# Patient Record
Sex: FEMALE | Race: WHITE | NOT HISPANIC OR LATINO | Employment: FULL TIME | URBAN - METROPOLITAN AREA
[De-identification: names, ages, dates, MRNs, and addresses within clinical notes are randomized per-mention and may not be internally consistent; named-entity substitution may affect disease eponyms.]

---
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
## 2020-02-27 ENCOUNTER — APPOINTMENT (OUTPATIENT)
Dept: LAB | Facility: HOSPITAL | Age: 20
End: 2020-02-27

## 2020-02-27 ENCOUNTER — TRANSCRIBE ORDERS (OUTPATIENT)
Dept: LAB | Facility: HOSPITAL | Age: 20
End: 2020-02-27

## 2020-02-27 DIAGNOSIS — R35.0 FREQUENCY OF MICTURITION: Primary | ICD-10-CM

## 2020-02-27 PROCEDURE — 87186 SC STD MICRODIL/AGAR DIL: CPT | Performed by: PEDIATRICS

## 2020-02-27 PROCEDURE — 87088 URINE BACTERIA CULTURE: CPT | Performed by: PEDIATRICS

## 2020-02-27 PROCEDURE — 87086 URINE CULTURE/COLONY COUNT: CPT | Performed by: PEDIATRICS

## 2020-02-29 LAB — BACTERIA SPEC AEROBE CULT: ABNORMAL

## 2020-03-16 ENCOUNTER — LAB (OUTPATIENT)
Dept: LAB | Facility: HOSPITAL | Age: 20
End: 2020-03-16

## 2020-03-16 ENCOUNTER — TRANSCRIBE ORDERS (OUTPATIENT)
Dept: LAB | Facility: HOSPITAL | Age: 20
End: 2020-03-16

## 2020-03-16 DIAGNOSIS — R35.0 FREQUENCY OF URINATION: Primary | ICD-10-CM

## 2020-03-16 DIAGNOSIS — R35.0 FREQUENCY OF URINATION: ICD-10-CM

## 2020-03-16 PROCEDURE — 87086 URINE CULTURE/COLONY COUNT: CPT

## 2020-03-17 LAB — BACTERIA SPEC AEROBE CULT: NO GROWTH

## 2020-08-25 ENCOUNTER — OFFICE VISIT (OUTPATIENT)
Dept: ORTHOPEDIC SURGERY | Facility: CLINIC | Age: 20
End: 2020-08-25

## 2020-08-25 VITALS — HEART RATE: 112 BPM | HEIGHT: 60 IN | OXYGEN SATURATION: 98 % | WEIGHT: 119.93 LBS | BODY MASS INDEX: 23.55 KG/M2

## 2020-08-25 DIAGNOSIS — G56.03 BILATERAL CARPAL TUNNEL SYNDROME: ICD-10-CM

## 2020-08-25 DIAGNOSIS — M25.531 RIGHT WRIST PAIN: Primary | ICD-10-CM

## 2020-08-25 PROCEDURE — 99203 OFFICE O/P NEW LOW 30 MIN: CPT | Performed by: PHYSICIAN ASSISTANT

## 2020-08-25 RX ORDER — HYDROCODONE BITARTRATE AND ACETAMINOPHEN 5; 325 MG/1; MG/1
1 TABLET ORAL EVERY 6 HOURS PRN
COMMUNITY
End: 2020-09-12 | Stop reason: HOSPADM

## 2020-08-25 NOTE — PROGRESS NOTES
[facility] Note    Subjective     Patient: Elza Rico  : 2000    Primary Care Provider: Shonda Chinchilla MD    Requesting Provider: As above    Pain of the Right Wrist  And left wrist    History    Chief Complaint: Bilateral wrist pain and numbness and tingling    History of Present Illness: Is a very pleasant 19-year-old female presenting today to discuss her 1 month history of bilateral wrist and hand numbness and tingling.  She is right-hand dominant.  She complains of symptoms in the thumb index long and half of her ring finger.  Is worse at night.  She works in welding for the past 5 months.  She rates the pain to be 5/10 and dull and aching.  She has it with both functional pain and night pain.  She has treated with hydrocodone, Robaxin without any improve pain.  She is here for further evaluation treatment recommendations.    Current Outpatient Medications on File Prior to Visit   Medication Sig Dispense Refill   • HYDROcodone-acetaminophen (NORCO) 5-325 MG per tablet Take 1 tablet by mouth Every 6 (Six) Hours As Needed.     • levocetirizine (XYZAL) 5 MG tablet Take 5 mg by mouth Every Evening.     • methocarbamol (ROBAXIN) 500 MG tablet Take 1 tablet by mouth 3 (Three) Times a Day. 21 tablet 0   • naproxen (EC NAPROSYN) 500 MG EC tablet Take 1 tablet by mouth 2 (Two) Times a Day With Meals. 60 tablet 0   • sertraline (ZOLOFT) 25 MG tablet Take 25 mg by mouth Daily.     • traZODone (DESYREL) 50 MG tablet Take 50 mg by mouth Every Night.       No current facility-administered medications on file prior to visit.       No Known Allergies   Past Medical History:   Diagnosis Date   • BENITO (juvenile idiopathic arthritis) (CMS/[facility])      History reviewed. No pertinent surgical history.  History reviewed. No pertinent family history.   Social History     Socioeconomic History   • Marital status: Single     Spouse name: Not on file   • Number of children: Not on file   •  "Years of education: Not on file   • Highest education level: Not on file   Tobacco Use   • Smoking status: Never Smoker   • Smokeless tobacco: Never Used   Substance and Sexual Activity   • Alcohol use: No     Frequency: Never   • Drug use: No        Review of Systems   Constitutional: Negative.    HENT: Negative.    Eyes: Negative.    Respiratory: Negative.    Cardiovascular: Negative.    Gastrointestinal: Negative.    Endocrine: Negative.    Genitourinary: Negative.    Musculoskeletal: Positive for arthralgias.   Skin: Negative.    Allergic/Immunologic: Negative.    Neurological: Negative.    Hematological: Negative.    Psychiatric/Behavioral: Negative.        The following portions of the patient's history were reviewed and updated as appropriate: allergies, current medications, past family history, past medical history, past social history, past surgical history and problem list.      Objective      Physical Exam  Pulse 112   Ht 152.4 cm (60\")   Wt 54.4 kg (119 lb 14.9 oz)   SpO2 98%   BMI 23.42 kg/m²     Body mass index is 23.42 kg/m².    GENERAL: Body habitus: normal weight for height    Gait: normal     Mental Status:  awake and alert; oriented to person, place, and time    Voice:  clear  SKIN:  Normal    Hair Growth:  Right:normal; Left:  normal  HEENT: Head: Normocephalic, atraumatic,  without obvious abnormality.  eye: normal external eye, no icterus   PULM:  Repiratory effort normal    Ortho Exam  Peripheral Vascular   Bilateral Upper Extremity    No cyanotic nail beds    Pink nail beds and rapid capillary refill   Palpation    Radial Pulse - Bilaterally normal    Neurologic   Sensory: Light touch intact- Right and left hand    Left Upper Extremity    Left wrist extensors: 5/5    Left wrist flexors: 5/5    Left intrinsics: 5/5   Right Upper Extremity    Right wrist extensors: 5/5    Right wrist flexors: 5/5    Right intrinsics: 5/5    Musculoskeletal   Left Elbow    Forearm supination: AROM - 90 " degrees    Forearm pronation: AROM - 90 degrees   Right Elbow    Forearm supination: AROM - 90 degrees    Forearm pronation: AROM - 90 degrees     Inspection and Palpation   Right Wrist      Tenderness - none    Swelling - none    Crepitus - none    Muscle tone - no atrophy   Left Wrist    Tenderness - none    Swelling - none    Crepitus - none    Muscle tone - no atrophy     ROJM:   Left Wrist    Flexion: AROM - 90 degrees    Extension: AROM - 90 degrees   Right Wrist    Flexion: AROM - 90 degrees    Extension: AROM - 90 degrees     Deformities, Malalignments, Discrepancies    None     Functional Testing   Right Wrist    Tinel's Sign positive    Phalen's Sign positive   Left Wrist    Tinel's Sign positive    Phalen's Sign negative           Strength and Tone    Right  strength: good    Left  strength: good     Hand Exam:          Full range of motion of the thumb MCPJ and IPJ bilaterally    Full range of motion of the index finger MCPJ, PIPJ and DIPJ  bilaterally    Full range of motion of the middle finger MCPJ, PIPJ and DIPJ bilaterally    Full range of motion of the ring finger MCPJ, PIPJ and DIPJ bilaterally    Full range of motion of the small finger MCPJ, PIPJ and DIPJ bialterally    FDS, FDP and extensor tendons are intact in the index, middle, ring and small fingers bilaterally    FPJ and extensor tendons are intact in the thumb.    No palpable triggering              Medical Decision Making    Data Review:   ordered and reviewed x-rays today    Assessment:  1. Right wrist pain    2. Bilateral carpal tunnel syndrome        Plan:  Bilateral carpal tunnel.  I reviewed today's right wrist x-rays and clinical findings with the patient.  She is positive Tinel's bilaterally with positive Phalen's on the right.  I think her pain numbness and tingling are secondary to carpal tunnel syndrome.  We discussed treatment including splinting at nighttime and during the day when possible.  If this does not resolve  it then she we may need to consider EMG.  We discussed treatment following EMG if it indeed is carpal tunnel including injection.  We will get her to cock-up wrist splints today and she will return to see me in 6 weeks or sooner if needed.    History, diagnosis and treatment plan discussed with Dr. Leone.          Rossy Brizuela PA-C  08/26/20  13:16

## 2020-09-09 PROCEDURE — 81025 URINE PREGNANCY TEST: CPT | Performed by: EMERGENCY MEDICINE

## 2020-09-09 PROCEDURE — 99284 EMERGENCY DEPT VISIT MOD MDM: CPT

## 2020-09-09 PROCEDURE — 81025 URINE PREGNANCY TEST: CPT

## 2020-09-09 RX ORDER — SODIUM CHLORIDE 0.9 % (FLUSH) 0.9 %
10 SYRINGE (ML) INJECTION AS NEEDED
Status: DISCONTINUED | OUTPATIENT
Start: 2020-09-09 | End: 2020-09-12 | Stop reason: HOSPADM

## 2020-09-10 ENCOUNTER — APPOINTMENT (OUTPATIENT)
Dept: CT IMAGING | Facility: HOSPITAL | Age: 20
End: 2020-09-10

## 2020-09-10 ENCOUNTER — ANESTHESIA EVENT (OUTPATIENT)
Dept: PERIOP | Facility: HOSPITAL | Age: 20
End: 2020-09-10

## 2020-09-10 ENCOUNTER — HOSPITAL ENCOUNTER (INPATIENT)
Facility: HOSPITAL | Age: 20
LOS: 2 days | Discharge: HOME | End: 2020-09-12
Attending: EMERGENCY MEDICINE | Admitting: INTERNAL MEDICINE

## 2020-09-10 ENCOUNTER — APPOINTMENT (OUTPATIENT)
Dept: GENERAL RADIOLOGY | Facility: HOSPITAL | Age: 20
End: 2020-09-10

## 2020-09-10 ENCOUNTER — ANESTHESIA (OUTPATIENT)
Dept: PERIOP | Facility: HOSPITAL | Age: 20
End: 2020-09-10

## 2020-09-10 DIAGNOSIS — N20.0 RENAL CALCULI: ICD-10-CM

## 2020-09-10 DIAGNOSIS — N39.0 ACUTE UTI: Primary | ICD-10-CM

## 2020-09-10 DIAGNOSIS — N20.0 KIDNEY STONE: ICD-10-CM

## 2020-09-10 DIAGNOSIS — N17.9 AKI (ACUTE KIDNEY INJURY) (HCC): ICD-10-CM

## 2020-09-10 PROBLEM — N10 ACUTE PYELONEPHRITIS: Status: ACTIVE | Noted: 2020-09-10

## 2020-09-10 PROBLEM — N13.8 URINARY TRACT OBSTRUCTION BY KIDNEY STONE: Status: ACTIVE | Noted: 2020-09-10

## 2020-09-10 PROBLEM — N13.30 HYDRONEPHROSIS: Status: ACTIVE | Noted: 2020-09-10

## 2020-09-10 PROBLEM — N12 PYELONEPHRITIS: Status: ACTIVE | Noted: 2020-09-10

## 2020-09-10 LAB
ALBUMIN SERPL-MCNC: 4.1 G/DL (ref 3.5–5.2)
ALBUMIN/GLOB SERPL: 1.1 G/DL
ALP SERPL-CCNC: 71 U/L (ref 39–117)
ALT SERPL W P-5'-P-CCNC: 9 U/L (ref 1–33)
ANION GAP SERPL CALCULATED.3IONS-SCNC: 12 MMOL/L (ref 5–15)
AST SERPL-CCNC: 18 U/L (ref 1–32)
B-HCG UR QL: NEGATIVE
BACTERIA UR QL AUTO: ABNORMAL /HPF
BASOPHILS # BLD MANUAL: 0 10*3/MM3 (ref 0–0.2)
BASOPHILS NFR BLD AUTO: 0 % (ref 0–1.5)
BILIRUB SERPL-MCNC: 0.4 MG/DL (ref 0–1.2)
BILIRUB UR QL STRIP: NEGATIVE
BUN SERPL-MCNC: 20 MG/DL (ref 6–20)
BUN/CREAT SERPL: 13.9 (ref 7–25)
CALCIUM SPEC-SCNC: 9.4 MG/DL (ref 8.6–10.5)
CHLORIDE SERPL-SCNC: 99 MMOL/L (ref 98–107)
CLARITY UR: ABNORMAL
CO2 SERPL-SCNC: 24 MMOL/L (ref 22–29)
COLOR UR: YELLOW
CREAT SERPL-MCNC: 1.44 MG/DL (ref 0.57–1)
D-LACTATE SERPL-SCNC: 1 MMOL/L (ref 0.5–2)
DEPRECATED RDW RBC AUTO: 43.8 FL (ref 37–54)
EOSINOPHIL # BLD MANUAL: 0 10*3/MM3 (ref 0–0.4)
EOSINOPHIL NFR BLD MANUAL: 0 % (ref 0.3–6.2)
ERYTHROCYTE [DISTWIDTH] IN BLOOD BY AUTOMATED COUNT: 14.3 % (ref 12.3–15.4)
GFR SERPL CREATININE-BSD FRML MDRD: 47 ML/MIN/1.73
GLOBULIN UR ELPH-MCNC: 3.6 GM/DL
GLUCOSE SERPL-MCNC: 133 MG/DL (ref 65–99)
GLUCOSE UR STRIP-MCNC: NEGATIVE MG/DL
HBA1C MFR BLD: 5.7 % (ref 4.8–5.6)
HCT VFR BLD AUTO: 37.5 % (ref 34–46.6)
HGB BLD-MCNC: 12 G/DL (ref 12–15.9)
HGB UR QL STRIP.AUTO: ABNORMAL
HOLD SPECIMEN: NORMAL
HOLD SPECIMEN: NORMAL
HYALINE CASTS UR QL AUTO: ABNORMAL /LPF
INTERNAL NEGATIVE CONTROL: NORMAL
INTERNAL POSITIVE CONTROL: NORMAL
KETONES UR QL STRIP: ABNORMAL
LEUKOCYTE ESTERASE UR QL STRIP.AUTO: ABNORMAL
LIPASE SERPL-CCNC: 14 U/L (ref 13–60)
LYMPHOCYTES # BLD MANUAL: 0.42 10*3/MM3 (ref 0.7–3.1)
LYMPHOCYTES NFR BLD MANUAL: 3 % (ref 19.6–45.3)
LYMPHOCYTES NFR BLD MANUAL: 5 % (ref 5–12)
Lab: NORMAL
MCH RBC QN AUTO: 26.8 PG (ref 26.6–33)
MCHC RBC AUTO-ENTMCNC: 32 G/DL (ref 31.5–35.7)
MCV RBC AUTO: 83.9 FL (ref 79–97)
MONOCYTES # BLD AUTO: 0.71 10*3/MM3 (ref 0.1–0.9)
NEUTROPHILS # BLD AUTO: 13 10*3/MM3 (ref 1.7–7)
NEUTROPHILS NFR BLD MANUAL: 83 % (ref 42.7–76)
NEUTS BAND NFR BLD MANUAL: 9 % (ref 0–5)
NITRITE UR QL STRIP: POSITIVE
PH UR STRIP.AUTO: 6 [PH] (ref 5–8)
PLAT MORPH BLD: NORMAL
PLATELET # BLD AUTO: 151 10*3/MM3 (ref 140–450)
PMV BLD AUTO: 11 FL (ref 6–12)
POTASSIUM SERPL-SCNC: 4.7 MMOL/L (ref 3.5–5.2)
PROCALCITONIN SERPL-MCNC: 40.31 NG/ML (ref 0–0.25)
PROT SERPL-MCNC: 7.7 G/DL (ref 6–8.5)
PROT UR QL STRIP: ABNORMAL
RBC # BLD AUTO: 4.47 10*6/MM3 (ref 3.77–5.28)
RBC # UR: ABNORMAL /HPF
RBC MORPH BLD: NORMAL
REF LAB TEST METHOD: ABNORMAL
SARS-COV-2 RDRP RESP QL NAA+PROBE: NOT DETECTED
SODIUM SERPL-SCNC: 135 MMOL/L (ref 136–145)
SP GR UR STRIP: 1.03 (ref 1–1.03)
SQUAMOUS #/AREA URNS HPF: ABNORMAL /HPF
UROBILINOGEN UR QL STRIP: ABNORMAL
WBC # BLD AUTO: 14.13 10*3/MM3 (ref 3.4–10.8)
WBC MORPH BLD: NORMAL
WBC UR QL AUTO: ABNORMAL /HPF
WHOLE BLOOD HOLD SPECIMEN: NORMAL
WHOLE BLOOD HOLD SPECIMEN: NORMAL

## 2020-09-10 PROCEDURE — 81001 URINALYSIS AUTO W/SCOPE: CPT | Performed by: EMERGENCY MEDICINE

## 2020-09-10 PROCEDURE — 87635 SARS-COV-2 COVID-19 AMP PRB: CPT | Performed by: EMERGENCY MEDICINE

## 2020-09-10 PROCEDURE — 84145 PROCALCITONIN (PCT): CPT | Performed by: FAMILY MEDICINE

## 2020-09-10 PROCEDURE — 83605 ASSAY OF LACTIC ACID: CPT | Performed by: PHYSICIAN ASSISTANT

## 2020-09-10 PROCEDURE — 87088 URINE BACTERIA CULTURE: CPT | Performed by: UROLOGY

## 2020-09-10 PROCEDURE — 25010000002 GENTAMICIN PER 80 MG: Performed by: UROLOGY

## 2020-09-10 PROCEDURE — 25010000002 HYDROMORPHONE PER 4 MG: Performed by: EMERGENCY MEDICINE

## 2020-09-10 PROCEDURE — 25010000002 MIDAZOLAM PER 1 MG: Performed by: NURSE ANESTHETIST, CERTIFIED REGISTERED

## 2020-09-10 PROCEDURE — 0TC78ZZ EXTIRPATION OF MATTER FROM LEFT URETER, VIA NATURAL OR ARTIFICIAL OPENING ENDOSCOPIC: ICD-10-PCS | Performed by: UROLOGY

## 2020-09-10 PROCEDURE — 82365 CALCULUS SPECTROSCOPY: CPT | Performed by: UROLOGY

## 2020-09-10 PROCEDURE — 25010000002 PROPOFOL 10 MG/ML EMULSION: Performed by: NURSE ANESTHETIST, CERTIFIED REGISTERED

## 2020-09-10 PROCEDURE — 76000 FLUOROSCOPY <1 HR PHYS/QHP: CPT

## 2020-09-10 PROCEDURE — 85007 BL SMEAR W/DIFF WBC COUNT: CPT | Performed by: EMERGENCY MEDICINE

## 2020-09-10 PROCEDURE — 25010000002 ONDANSETRON PER 1 MG: Performed by: EMERGENCY MEDICINE

## 2020-09-10 PROCEDURE — 99222 1ST HOSP IP/OBS MODERATE 55: CPT | Performed by: FAMILY MEDICINE

## 2020-09-10 PROCEDURE — 25010000002 DEXAMETHASONE PER 1 MG: Performed by: NURSE ANESTHETIST, CERTIFIED REGISTERED

## 2020-09-10 PROCEDURE — 0T778DZ DILATION OF LEFT URETER WITH INTRALUMINAL DEVICE, VIA NATURAL OR ARTIFICIAL OPENING ENDOSCOPIC: ICD-10-PCS | Performed by: UROLOGY

## 2020-09-10 PROCEDURE — 80053 COMPREHEN METABOLIC PANEL: CPT

## 2020-09-10 PROCEDURE — 87186 SC STD MICRODIL/AGAR DIL: CPT | Performed by: UROLOGY

## 2020-09-10 PROCEDURE — 83036 HEMOGLOBIN GLYCOSYLATED A1C: CPT | Performed by: FAMILY MEDICINE

## 2020-09-10 PROCEDURE — 25010000002 PHENYLEPHRINE PER 1 ML: Performed by: NURSE ANESTHETIST, CERTIFIED REGISTERED

## 2020-09-10 PROCEDURE — 25010000002 MORPHINE PER 10 MG: Performed by: FAMILY MEDICINE

## 2020-09-10 PROCEDURE — 85025 COMPLETE CBC W/AUTO DIFF WBC: CPT | Performed by: EMERGENCY MEDICINE

## 2020-09-10 PROCEDURE — C1769 GUIDE WIRE: HCPCS | Performed by: UROLOGY

## 2020-09-10 PROCEDURE — 25010000002 CEFTRIAXONE PER 250 MG: Performed by: UROLOGY

## 2020-09-10 PROCEDURE — 83690 ASSAY OF LIPASE: CPT

## 2020-09-10 PROCEDURE — 25010000002 FENTANYL CITRATE (PF) 100 MCG/2ML SOLUTION: Performed by: NURSE ANESTHETIST, CERTIFIED REGISTERED

## 2020-09-10 PROCEDURE — 74176 CT ABD & PELVIS W/O CONTRAST: CPT

## 2020-09-10 PROCEDURE — 87086 URINE CULTURE/COLONY COUNT: CPT | Performed by: UROLOGY

## 2020-09-10 PROCEDURE — 25010000002 ONDANSETRON PER 1 MG: Performed by: NURSE ANESTHETIST, CERTIFIED REGISTERED

## 2020-09-10 PROCEDURE — C1758 CATHETER, URETERAL: HCPCS | Performed by: UROLOGY

## 2020-09-10 PROCEDURE — C2617 STENT, NON-COR, TEM W/O DEL: HCPCS | Performed by: UROLOGY

## 2020-09-10 PROCEDURE — 25010000002 CEFTRIAXONE PER 250 MG: Performed by: EMERGENCY MEDICINE

## 2020-09-10 DEVICE — URETERAL STENT
Type: IMPLANTABLE DEVICE | Site: URETER | Status: FUNCTIONAL
Brand: PERCUFLEX™ PLUS

## 2020-09-10 RX ORDER — SODIUM CHLORIDE 0.9 % (FLUSH) 0.9 %
10 SYRINGE (ML) INJECTION EVERY 12 HOURS SCHEDULED
Status: DISCONTINUED | OUTPATIENT
Start: 2020-09-10 | End: 2020-09-12 | Stop reason: HOSPADM

## 2020-09-10 RX ORDER — NALOXONE HCL 0.4 MG/ML
0.4 VIAL (ML) INJECTION
Status: DISCONTINUED | OUTPATIENT
Start: 2020-09-10 | End: 2020-09-12 | Stop reason: HOSPADM

## 2020-09-10 RX ORDER — MAGNESIUM HYDROXIDE 1200 MG/15ML
LIQUID ORAL AS NEEDED
Status: DISCONTINUED | OUTPATIENT
Start: 2020-09-10 | End: 2020-09-10 | Stop reason: HOSPADM

## 2020-09-10 RX ORDER — LIDOCAINE HYDROCHLORIDE 10 MG/ML
INJECTION, SOLUTION EPIDURAL; INFILTRATION; INTRACAUDAL; PERINEURAL AS NEEDED
Status: DISCONTINUED | OUTPATIENT
Start: 2020-09-10 | End: 2020-09-10 | Stop reason: SURG

## 2020-09-10 RX ORDER — FAMOTIDINE 10 MG/ML
20 INJECTION, SOLUTION INTRAVENOUS ONCE
Status: DISCONTINUED | OUTPATIENT
Start: 2020-09-10 | End: 2020-09-10 | Stop reason: HOSPADM

## 2020-09-10 RX ORDER — SODIUM CHLORIDE 0.9 % (FLUSH) 0.9 %
10 SYRINGE (ML) INJECTION AS NEEDED
Status: DISCONTINUED | OUTPATIENT
Start: 2020-09-10 | End: 2020-09-10 | Stop reason: HOSPADM

## 2020-09-10 RX ORDER — DEXAMETHASONE SODIUM PHOSPHATE 4 MG/ML
INJECTION, SOLUTION INTRA-ARTICULAR; INTRALESIONAL; INTRAMUSCULAR; INTRAVENOUS; SOFT TISSUE AS NEEDED
Status: DISCONTINUED | OUTPATIENT
Start: 2020-09-10 | End: 2020-09-10 | Stop reason: SURG

## 2020-09-10 RX ORDER — ONDANSETRON 2 MG/ML
4 INJECTION INTRAMUSCULAR; INTRAVENOUS EVERY 6 HOURS PRN
Status: DISCONTINUED | OUTPATIENT
Start: 2020-09-10 | End: 2020-09-12 | Stop reason: HOSPADM

## 2020-09-10 RX ORDER — BISACODYL 5 MG/1
5 TABLET, DELAYED RELEASE ORAL DAILY PRN
Status: DISCONTINUED | OUTPATIENT
Start: 2020-09-10 | End: 2020-09-12 | Stop reason: HOSPADM

## 2020-09-10 RX ORDER — SODIUM CHLORIDE 9 MG/ML
125 INJECTION, SOLUTION INTRAVENOUS CONTINUOUS
Status: ACTIVE | OUTPATIENT
Start: 2020-09-10 | End: 2020-09-10

## 2020-09-10 RX ORDER — PROPOFOL 10 MG/ML
VIAL (ML) INTRAVENOUS AS NEEDED
Status: DISCONTINUED | OUTPATIENT
Start: 2020-09-10 | End: 2020-09-10 | Stop reason: SURG

## 2020-09-10 RX ORDER — ACETAMINOPHEN 650 MG/1
650 SUPPOSITORY RECTAL EVERY 4 HOURS PRN
Status: DISCONTINUED | OUTPATIENT
Start: 2020-09-10 | End: 2020-09-12 | Stop reason: HOSPADM

## 2020-09-10 RX ORDER — HYDROMORPHONE HYDROCHLORIDE 1 MG/ML
0.5 INJECTION, SOLUTION INTRAMUSCULAR; INTRAVENOUS; SUBCUTANEOUS
Status: COMPLETED | OUTPATIENT
Start: 2020-09-10 | End: 2020-09-10

## 2020-09-10 RX ORDER — ONDANSETRON 2 MG/ML
4 INJECTION INTRAMUSCULAR; INTRAVENOUS ONCE
Status: COMPLETED | OUTPATIENT
Start: 2020-09-10 | End: 2020-09-10

## 2020-09-10 RX ORDER — MIDAZOLAM HYDROCHLORIDE 1 MG/ML
INJECTION INTRAMUSCULAR; INTRAVENOUS AS NEEDED
Status: DISCONTINUED | OUTPATIENT
Start: 2020-09-10 | End: 2020-09-10 | Stop reason: SURG

## 2020-09-10 RX ORDER — ONDANSETRON 2 MG/ML
INJECTION INTRAMUSCULAR; INTRAVENOUS AS NEEDED
Status: DISCONTINUED | OUTPATIENT
Start: 2020-09-10 | End: 2020-09-10 | Stop reason: SURG

## 2020-09-10 RX ORDER — FENTANYL CITRATE 50 UG/ML
50 INJECTION, SOLUTION INTRAMUSCULAR; INTRAVENOUS
Status: DISCONTINUED | OUTPATIENT
Start: 2020-09-10 | End: 2020-09-10

## 2020-09-10 RX ORDER — LIDOCAINE HYDROCHLORIDE 10 MG/ML
0.5 INJECTION, SOLUTION EPIDURAL; INFILTRATION; INTRACAUDAL; PERINEURAL ONCE AS NEEDED
Status: DISCONTINUED | OUTPATIENT
Start: 2020-09-10 | End: 2020-09-10 | Stop reason: HOSPADM

## 2020-09-10 RX ORDER — SODIUM CHLORIDE 0.9 % (FLUSH) 0.9 %
10 SYRINGE (ML) INJECTION EVERY 12 HOURS SCHEDULED
Status: DISCONTINUED | OUTPATIENT
Start: 2020-09-10 | End: 2020-09-10 | Stop reason: HOSPADM

## 2020-09-10 RX ORDER — LIDOCAINE HYDROCHLORIDE 20 MG/ML
JELLY TOPICAL AS NEEDED
Status: DISCONTINUED | OUTPATIENT
Start: 2020-09-10 | End: 2020-09-10 | Stop reason: HOSPADM

## 2020-09-10 RX ORDER — ACETAMINOPHEN 325 MG/1
650 TABLET ORAL EVERY 4 HOURS PRN
Status: DISCONTINUED | OUTPATIENT
Start: 2020-09-10 | End: 2020-09-12 | Stop reason: HOSPADM

## 2020-09-10 RX ORDER — TAMSULOSIN HYDROCHLORIDE 0.4 MG/1
0.4 CAPSULE ORAL DAILY
Status: DISCONTINUED | OUTPATIENT
Start: 2020-09-10 | End: 2020-09-11

## 2020-09-10 RX ORDER — MORPHINE SULFATE 2 MG/ML
2 INJECTION, SOLUTION INTRAMUSCULAR; INTRAVENOUS EVERY 4 HOURS PRN
Status: DISPENSED | OUTPATIENT
Start: 2020-09-10 | End: 2020-09-12

## 2020-09-10 RX ORDER — FAMOTIDINE 20 MG/1
20 TABLET, FILM COATED ORAL
Status: DISCONTINUED | OUTPATIENT
Start: 2020-09-10 | End: 2020-09-10 | Stop reason: HOSPADM

## 2020-09-10 RX ORDER — SERTRALINE HYDROCHLORIDE 25 MG/1
25 TABLET, FILM COATED ORAL DAILY
Status: DISCONTINUED | OUTPATIENT
Start: 2020-09-10 | End: 2020-09-12 | Stop reason: HOSPADM

## 2020-09-10 RX ORDER — FAMOTIDINE 10 MG/ML
20 INJECTION, SOLUTION INTRAVENOUS
Status: DISCONTINUED | OUTPATIENT
Start: 2020-09-10 | End: 2020-09-10 | Stop reason: HOSPADM

## 2020-09-10 RX ORDER — FAMOTIDINE 20 MG/1
20 TABLET, FILM COATED ORAL ONCE
Status: DISCONTINUED | OUTPATIENT
Start: 2020-09-10 | End: 2020-09-10 | Stop reason: HOSPADM

## 2020-09-10 RX ORDER — TRAZODONE HYDROCHLORIDE 50 MG/1
50 TABLET ORAL NIGHTLY
Status: DISCONTINUED | OUTPATIENT
Start: 2020-09-10 | End: 2020-09-12 | Stop reason: HOSPADM

## 2020-09-10 RX ORDER — SODIUM CHLORIDE, SODIUM LACTATE, POTASSIUM CHLORIDE, CALCIUM CHLORIDE 600; 310; 30; 20 MG/100ML; MG/100ML; MG/100ML; MG/100ML
9 INJECTION, SOLUTION INTRAVENOUS CONTINUOUS PRN
Status: DISCONTINUED | OUTPATIENT
Start: 2020-09-10 | End: 2020-09-12 | Stop reason: HOSPADM

## 2020-09-10 RX ORDER — FENTANYL CITRATE 50 UG/ML
INJECTION, SOLUTION INTRAMUSCULAR; INTRAVENOUS AS NEEDED
Status: DISCONTINUED | OUTPATIENT
Start: 2020-09-10 | End: 2020-09-10 | Stop reason: SURG

## 2020-09-10 RX ORDER — HYDROMORPHONE HYDROCHLORIDE 1 MG/ML
0.25 INJECTION, SOLUTION INTRAMUSCULAR; INTRAVENOUS; SUBCUTANEOUS
Status: DISCONTINUED | OUTPATIENT
Start: 2020-09-10 | End: 2020-09-10

## 2020-09-10 RX ORDER — SODIUM CHLORIDE 0.9 % (FLUSH) 0.9 %
10 SYRINGE (ML) INJECTION AS NEEDED
Status: DISCONTINUED | OUTPATIENT
Start: 2020-09-10 | End: 2020-09-12 | Stop reason: HOSPADM

## 2020-09-10 RX ORDER — SODIUM CHLORIDE, SODIUM LACTATE, POTASSIUM CHLORIDE, CALCIUM CHLORIDE 600; 310; 30; 20 MG/100ML; MG/100ML; MG/100ML; MG/100ML
9 INJECTION, SOLUTION INTRAVENOUS CONTINUOUS
Status: DISCONTINUED | OUTPATIENT
Start: 2020-09-10 | End: 2020-09-12 | Stop reason: HOSPADM

## 2020-09-10 RX ORDER — ACETAMINOPHEN 160 MG/5ML
650 SOLUTION ORAL EVERY 4 HOURS PRN
Status: DISCONTINUED | OUTPATIENT
Start: 2020-09-10 | End: 2020-09-12 | Stop reason: HOSPADM

## 2020-09-10 RX ORDER — GENTAMICIN SULFATE 80 MG/100ML
80 INJECTION, SOLUTION INTRAVENOUS ONCE
Status: COMPLETED | OUTPATIENT
Start: 2020-09-10 | End: 2020-09-10

## 2020-09-10 RX ORDER — ONDANSETRON 2 MG/ML
4 INJECTION INTRAMUSCULAR; INTRAVENOUS ONCE
Status: DISCONTINUED | OUTPATIENT
Start: 2020-09-10 | End: 2020-09-10 | Stop reason: SDUPTHER

## 2020-09-10 RX ADMIN — ONDANSETRON 4 MG: 2 INJECTION INTRAMUSCULAR; INTRAVENOUS at 15:28

## 2020-09-10 RX ADMIN — TRAZODONE HYDROCHLORIDE 50 MG: 50 TABLET ORAL at 21:31

## 2020-09-10 RX ADMIN — DEXAMETHASONE SODIUM PHOSPHATE 8 MG: 4 INJECTION, SOLUTION INTRAMUSCULAR; INTRAVENOUS at 15:16

## 2020-09-10 RX ADMIN — SODIUM CHLORIDE, POTASSIUM CHLORIDE, SODIUM LACTATE AND CALCIUM CHLORIDE: 600; 310; 30; 20 INJECTION, SOLUTION INTRAVENOUS at 15:03

## 2020-09-10 RX ADMIN — SODIUM CHLORIDE, PRESERVATIVE FREE 10 ML: 5 INJECTION INTRAVENOUS at 09:28

## 2020-09-10 RX ADMIN — MIDAZOLAM 2 MG: 1 INJECTION INTRAMUSCULAR; INTRAVENOUS at 15:08

## 2020-09-10 RX ADMIN — GENTAMICIN SULFATE 80 MG: 80 INJECTION, SOLUTION INTRAVENOUS at 15:11

## 2020-09-10 RX ADMIN — PROPOFOL 50 MG: 10 INJECTION, EMULSION INTRAVENOUS at 15:08

## 2020-09-10 RX ADMIN — HYDROMORPHONE HYDROCHLORIDE 0.5 MG: 1 INJECTION, SOLUTION INTRAMUSCULAR; INTRAVENOUS; SUBCUTANEOUS at 03:16

## 2020-09-10 RX ADMIN — ACETAMINOPHEN 650 MG: 325 TABLET, FILM COATED ORAL at 06:57

## 2020-09-10 RX ADMIN — PHENYLEPHRINE HYDROCHLORIDE 200 MCG: 10 INJECTION INTRAVENOUS at 15:10

## 2020-09-10 RX ADMIN — SODIUM CHLORIDE 1000 ML: 9 INJECTION, SOLUTION INTRAVENOUS at 05:05

## 2020-09-10 RX ADMIN — SODIUM CHLORIDE 125 ML/HR: 9 INJECTION, SOLUTION INTRAVENOUS at 06:49

## 2020-09-10 RX ADMIN — MORPHINE SULFATE 2 MG: 2 INJECTION, SOLUTION INTRAMUSCULAR; INTRAVENOUS at 13:10

## 2020-09-10 RX ADMIN — ONDANSETRON 4 MG: 2 INJECTION INTRAMUSCULAR; INTRAVENOUS at 03:08

## 2020-09-10 RX ADMIN — PHENYLEPHRINE HYDROCHLORIDE 100 MCG: 10 INJECTION INTRAVENOUS at 15:15

## 2020-09-10 RX ADMIN — SERTRALINE HYDROCHLORIDE 25 MG: 25 TABLET ORAL at 09:28

## 2020-09-10 RX ADMIN — HYDROMORPHONE HYDROCHLORIDE 0.5 MG: 1 INJECTION, SOLUTION INTRAMUSCULAR; INTRAVENOUS; SUBCUTANEOUS at 06:49

## 2020-09-10 RX ADMIN — FENTANYL CITRATE 100 MCG: 50 INJECTION, SOLUTION INTRAMUSCULAR; INTRAVENOUS at 15:08

## 2020-09-10 RX ADMIN — PHENYLEPHRINE HYDROCHLORIDE 100 MCG: 10 INJECTION INTRAVENOUS at 15:09

## 2020-09-10 RX ADMIN — CEFTRIAXONE SODIUM 1 G: 1 INJECTION, POWDER, FOR SOLUTION INTRAMUSCULAR; INTRAVENOUS at 03:07

## 2020-09-10 RX ADMIN — CEFTRIAXONE SODIUM 1 G: 1 INJECTION, POWDER, FOR SOLUTION INTRAMUSCULAR; INTRAVENOUS at 21:31

## 2020-09-10 RX ADMIN — SODIUM CHLORIDE 2000 ML: 9 INJECTION, SOLUTION INTRAVENOUS at 03:08

## 2020-09-10 RX ADMIN — LIDOCAINE HYDROCHLORIDE 50 MG: 10 INJECTION, SOLUTION EPIDURAL; INFILTRATION; INTRACAUDAL; PERINEURAL at 15:08

## 2020-09-10 RX ADMIN — TAMSULOSIN HYDROCHLORIDE 0.4 MG: 0.4 CAPSULE ORAL at 09:28

## 2020-09-10 NOTE — ED PROVIDER NOTES
Subjective   Patient is a very pleasant 19-year-old female who presents with a two-week history of worsening lower abdomen and left flank pain.  She states that she has had dysuria and increased urinary frequency over this time period.  She is had a generalized fatigue and chills but denies fever.  She is also has been nauseous and had one episode of vomiting prior to arrival today.  She has progressively worsening weakness.  States that approximately 6 months ago she was diagnosed and treated for UTI by her primary care provider.  She has not seen anyone in the past 2 weeks for this episode as she kept hoping that it would improve on its own.      Abdominal Pain   Pain location:  Periumbilical, suprapubic and LLQ (Primarily suprapubic, left lower quadrant, and to a lesser degree left upper quadrant.)  Pain quality: sharp    Pain radiates to:  Does not radiate  Pain severity:  Moderate  Onset quality:  Gradual  Duration:  2 weeks  Timing:  Constant  Progression:  Worsening  Chronicity:  New  Context: not previous surgeries, not recent illness, not suspicious food intake and not trauma    Worsened by:  Nothing  Ineffective treatments:  None tried  Associated symptoms: anorexia, chills, dysuria, fatigue, nausea and vomiting    Associated symptoms: no chest pain, no cough, no diarrhea, no melena and no shortness of breath        Review of Systems   Constitutional: Positive for chills and fatigue.   Respiratory: Negative for cough and shortness of breath.    Cardiovascular: Negative for chest pain.   Gastrointestinal: Positive for abdominal pain, anorexia, nausea and vomiting. Negative for diarrhea and melena.   Genitourinary: Positive for dysuria.   All other systems reviewed and are negative.      Past Medical History:   Diagnosis Date   • Carpal tunnel syndrome    • BENITO (juvenile idiopathic arthritis) (CMS/[facility])    • Mood disorder (CMS/[facility])        No Known Allergies    History reviewed. No pertinent surgical  history.    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 She appears well-developed and well-nourished. No distress.   HENT:   Head: Normocephalic and atraumatic.   Eyes: Pupils are equal, round, and reactive to light. Conjunctivae and EOM are normal.   Neck: Normal range of motion. Neck supple.   Cardiovascular: Normal rate, regular rhythm, normal heart sounds and intact distal pulses. Exam reveals no gallop and no friction rub.   No murmur heard.  Pulmonary/Chest: Effort normal and breath sounds normal. No respiratory distress.   Abdominal: Soft. Bowel sounds are normal. She exhibits no distension and no mass. There is no tenderness. There is no rebound.   Musculoskeletal: Normal range of motion.   Neurological: She is alert and oriented to person, place, and time.   Skin: Skin is warm and dry. Capillary refill takes less than 2 seconds.   Psychiatric: She has a normal mood and affect. Her behavior is normal. Judgment and thought content normal.   Nursing note and vitals reviewed.      Procedures           ED Course  ED Course as of Sep 10 2046   Thu Sep 10, 2020   8337 I discussed the case with Dr. Amin who will see the patient today.    [CP]   0427 I discussed the case with Dr. Pearson who has agreed to admit.    [CP]      ED Course User Index  [CP] Ramiro Reyes,       Recent Results (from  the past 24 hour(s))   Comprehensive Metabolic Panel    Collection Time: 09/10/20  1:00 AM   Result Value Ref Range    Glucose 133 (H) 65 - 99 mg/dL    BUN 20 6 - 20 mg/dL    Creatinine 1.44 (H) 0.57 - 1.00 mg/dL    Sodium 135 (L) 136 - 145 mmol/L    Potassium 4.7 3.5 - 5.2 mmol/L    Chloride 99 98 - 107 mmol/L    CO2 24.0 22.0 - 29.0 mmol/L    Calcium 9.4 8.6 - 10.5 mg/dL    Total Protein 7.7 6.0 - 8.5 g/dL    Albumin 4.10 3.50 - 5.20 g/dL    ALT (SGPT) 9 1 - 33 U/L    AST (SGOT) 18 1 - 32 U/L    Alkaline Phosphatase 71 39 - 117 U/L    Total Bilirubin 0.4 0.0 - 1.2 mg/dL    eGFR Non African Amer 47 (L) >60 mL/min/1.73    Globulin 3.6 gm/dL    A/G Ratio 1.1 g/dL    BUN/Creatinine Ratio 13.9 7.0 - 25.0    Anion Gap 12.0 5.0 - 15.0 mmol/L   Lipase    Collection Time: 09/10/20  1:00 AM   Result Value Ref Range    Lipase 14 13 - 60 U/L   Light Blue Top    Collection Time: 09/10/20  1:00 AM   Result Value Ref Range    Extra Tube hold for add-on    Green Top (Gel)    Collection Time: 09/10/20  1:00 AM   Result Value Ref Range    Extra Tube Hold for add-ons.    Lavender Top    Collection Time: 09/10/20  1:00 AM   Result Value Ref Range    Extra Tube hold for add-on    Gold Top - SST    Collection Time: 09/10/20  1:00 AM   Result Value Ref Range    Extra Tube Hold for add-ons.    CBC Auto Differential    Collection Time: 09/10/20  1:00 AM   Result Value Ref Range    WBC 14.13 (H) 3.40 - 10.80 10*3/mm3    RBC 4.47 3.77 - 5.28 10*6/mm3    Hemoglobin 12.0 12.0 - 15.9 g/dL    Hematocrit 37.5 34.0 - 46.6 %    MCV 83.9 79.0 - 97.0 fL    MCH 26.8 26.6 - 33.0 pg    MCHC 32.0 31.5 - 35.7 g/dL    RDW 14.3 12.3 - 15.4 %    RDW-SD 43.8 37.0 - 54.0 fl    MPV 11.0 6.0 - 12.0 fL    Platelets 151 140 - 450 10*3/mm3   Manual Differential    Collection Time: 09/10/20  1:00 AM   Result Value Ref Range    Neutrophil % 83.0 (H) 42.7 - 76.0 %    Lymphocyte % 3.0 (L) 19.6 - 45.3 %    Monocyte % 5.0 5.0 - 12.0 %    Eosinophil % 0.0 (L) 0.3 - 6.2 %     Basophil % 0.0 0.0 - 1.5 %    Bands %  9.0 (H) 0.0 - 5.0 %    Neutrophils Absolute 13.00 (H) 1.70 - 7.00 10*3/mm3    Lymphocytes Absolute 0.42 (L) 0.70 - 3.10 10*3/mm3    Monocytes Absolute 0.71 0.10 - 0.90 10*3/mm3    Eosinophils Absolute 0.00 0.00 - 0.40 10*3/mm3    Basophils Absolute 0.00 0.00 - 0.20 10*3/mm3    RBC Morphology Normal Normal    WBC Morphology Normal Normal    Platelet Morphology Normal Normal   Procalcitonin    Collection Time: 09/10/20  1:00 AM   Result Value Ref Range    Procalcitonin 40.31 (H) 0.00 - 0.25 ng/mL   Hemoglobin A1c    Collection Time: 09/10/20  1:00 AM   Result Value Ref Range    Hemoglobin A1C 5.70 (H) 4.80 - 5.60 %   Urinalysis With Microscopic If Indicated (No Culture) - Urine, Clean Catch    Collection Time: 09/10/20  1:02 AM   Result Value Ref Range    Color, UA Yellow Yellow, Straw    Appearance, UA Cloudy (A) Clear    pH, UA 6.0 5.0 - 8.0    Specific Gravity, UA 1.034 (H) 1.001 - 1.030    Glucose, UA Negative Negative    Ketones, UA Trace (A) Negative    Bilirubin, UA Negative Negative    Blood, UA Large (3+) (A) Negative    Protein, UA 30 mg/dL (1+) (A) Negative    Leuk Esterase, UA Moderate (2+) (A) Negative    Nitrite, UA Positive (A) Negative    Urobilinogen, UA 0.2 E.U./dL 0.2 - 1.0 E.U./dL   Urinalysis, Microscopic Only - Urine, Clean Catch    Collection Time: 09/10/20  1:02 AM   Result Value Ref Range    RBC, UA 31-50 (A) None Seen, 0-2 /HPF    WBC, UA Too Numerous to Count (A) None Seen, 0-2 /HPF    Bacteria, UA 4+ (A) None Seen, Trace /HPF    Squamous Epithelial Cells, UA 0-2 None Seen, 0-2 /HPF    Hyaline Casts, UA 0-6 0 - 6 /LPF    Methodology Manual Light Microscopy    POCT pregnancy, urine    Collection Time: 09/10/20  1:10 AM   Result Value Ref Range    HCG, Urine, QL Negative Negative    Lot Number [id number]     Internal Positive Control Presumptive Positive     Internal Negative Control Presumptive Negative    COVID-19, [facility] IN-HOUSE,NP Swab (NO  TRANSPORT MEDIA) 2 HR TAT - Swab, Nasopharynx    Collection Time: 09/10/20  5:09 AM   Result Value Ref Range    COVID19 Not Detected Not Detected - Ref. Range   Lactic Acid, Plasma    Collection Time: 09/10/20  6:03 AM   Result Value Ref Range    Lactate 1.0 0.5 - 2.0 mmol/L     Note: In addition to lab results from this visit, the labs listed above may include labs taken at another facility or during a different encounter within the last 24 hours. Please correlate lab times with ED admission and discharge times for further clarification of the services performed during this visit.    CT Abdomen Pelvis Without Contrast   Final Result      1. There is mild left hydronephrosis. A 2 to 3 mm calcification in the left hemipelvis a few cm above the UVJ is probably a distal left ureteral stone.   2. Small nonobstructing stones in the right kidney.   3. Grossly normal GI tract, including the appendix.   4. Free fluid in the cul-de-sac, likely physiologic finding.               Signer Name: Jayy Goldman MD    Signed: 9/10/2020 3:27 AM    Workstation Name: [id number]     [facility] C Arm During Surgery    (Results Pending)     Vitals:    09/10/20 1605 09/10/20 1615 09/10/20 1630 09/10/20 1656   BP: 99/54 99/51 102/64 96/67   BP Location: Left arm Left arm Left arm Left arm   Patient Position: Lying Lying Lying Lying   Pulse: 102 94 95 94   Resp: 16 16 20 16   Temp:   (!) 100.6 °F (38.1 °C) 98.4 °F (36.9 °C)   TempSrc:   Temporal Oral   SpO2: 100% 91% 98% 93%   Weight:       Height:         Medications   sodium chloride 0.9 % flush 10 mL ( Intravenous MAR Unhold 9/10/20 1648)   cefTRIAXone (ROCEPHIN) 1 g/100 mL 0.9% NS (MBP) ( Intravenous MAR Unhold 9/10/20 1648)   sertraline (ZOLOFT) tablet 25 mg (25 mg Oral Given 9/10/20 0928)   traZODone (DESYREL) tablet 50 mg (has no administration in time range)   sodium chloride 0.9 % flush 10 mL ( Intravenous MAR Unhold 9/10/20 1648)   sodium chloride  0.9 % flush 10 mL ( Intravenous MAR Unhold 9/10/20 1648)   sodium chloride 0.9 % infusion (125 mL/hr Intravenous Currently Infusing 9/10/20 1253)   acetaminophen (TYLENOL) tablet 650 mg ( Oral MAR Unhold 9/10/20 1648)     Or   acetaminophen (TYLENOL) 160 MG/5ML solution 650 mg ( Oral MAR Unhold 9/10/20 1648)     Or   acetaminophen (TYLENOL) suppository 650 mg ( Rectal MAR Unhold 9/10/20 1648)   ondansetron (ZOFRAN) injection 4 mg ( Intravenous MAR Unhold 9/10/20 1648)   morphine injection 2 mg ( Intravenous MAR Unhold 9/10/20 1648)     And   naloxone (NARCAN) injection 0.4 mg ( Intravenous MAR Unhold 9/10/20 1648)   bisacodyl (DULCOLAX) EC tablet 5 mg ( Oral MAR Unhold 9/10/20 1648)   tamsulosin (FLOMAX) 24 hr capsule 0.4 mg ( Oral MAR Unhold 9/10/20 1648)   lactated ringers infusion ( Intravenous Anesthesia Volume Adjustment 9/10/20 1542)   lactated ringers infusion (has no administration in time range)   HYDROmorphone (DILAUDID) injection 0.5 mg (0.5 mg Intravenous Given 9/10/20 0649)   ondansetron (ZOFRAN) injection 4 mg (4 mg Intravenous Given 9/10/20 0308)   sodium chloride 0.9 % bolus 2,000 mL (0 mL Intravenous Stopped 9/10/20 0506)   cefTRIAXone (ROCEPHIN) 1 g/100 mL 0.9% NS (MBP) (0 g Intravenous Stopped 9/10/20 0506)   sodium chloride 0.9 % bolus 1,000 mL (0 mL Intravenous Stopped 9/10/20 0645)   gentamicin (GARAMYCIN) IVPB 80 mg (80 mg Intravenous Given 9/10/20 1511)     ECG/EMG Results (last 24 hours)     ** No results found for the last 24 hours. **        ECG 12 Lead    (Results Pending)                 MDM    Final diagnoses:   Acute UTI   Kidney stone   DIANDRA (acute kidney injury) (CMS/[facility])            Ramiro Reyes DO  09/10/20 2043

## 2020-09-10 NOTE — ANESTHESIA PREPROCEDURE EVALUATION
Anesthesia Evaluation     Patient summary reviewed and Nursing notes reviewed                Airway   Mallampati: II  TM distance: >3 FB  Neck ROM: full  No difficulty expected  Dental - normal exam     Pulmonary - negative pulmonary ROS and normal exam   Cardiovascular - negative cardio ROS and normal exam        Neuro/Psych- negative ROS  GI/Hepatic/Renal/Endo    (+)   renal disease stones,     Musculoskeletal     Abdominal  - normal exam    Bowel sounds: normal.   Substance History - negative use     OB/GYN negative ob/gyn ROS         Other   arthritis,                      Anesthesia Plan    ASA 2     general     intravenous induction     Anesthetic plan, all risks, benefits, and alternatives have been provided, discussed and informed consent has been obtained with: patient.    Plan discussed with CRNA.

## 2020-09-10 NOTE — PROGRESS NOTES
[facility] Medicine Services  CLINICAL REVIEW NOTE    Patient Name: Elza Rico  : 2000  MRN: [id number]    Date of Admission: 9/10/2020  Length of Stay: 0  Attending Service:  Jaclyn Arreaga      Elza Rico is a 19 y.o. female feels left lower abdomen and left sided back pain today.  She vomited on the way to the ER but not since.  The patient's creatinine was elevated on admission at 1.44.  She was started on fluids.  Her urinalysis revealed too numerous to count white blood cells, bacteria, nitrite positive and leukocytes.  She was treated with 1 g of Rocephin and this has been ordered for 7 days daily.  Urine culture is currently pending.  CT abdomen pelvis shows mottled left hydronephrosis and to-3 mm calcification in the left hemipelvis just above the UVJ.  Urology consult pending.      EXAM:  Constitutional: No acute distress, awake, alert  HENT: NCAT, mucous membranes moist  Respiratory: Clear to auscultation bilaterally, respiratory effort normal   Cardiovascular: RRR, no murmurs, rubs, or gallops  Gastrointestinal: Positive bowel sounds, soft, nontender, nondistended  Musculoskeletal: No bilateral ankle edema  Psychiatric: Appropriate affect, cooperative  Neurologic: Oriented x 3, strength symmetric in all extremities, Cranial Nerves grossly intact to confrontation, speech clear  Skin: No rashes    ASSESSMENT AND PLAN:   Left distal ureteral stone with hydronephrosis      Pyelonephritis   - 2-3 mm stone on CT  - Urology consult pending  - IV rocephin 1g daily  - Urine culture pending  - blood cultures x 2  - pain control  --consider chlamydia and gonorrhea testing     DIANDRA  - in setting of #1  - aggressive hydration  - renally dose, avoid nephrotoxins   -repeat labs tomorrow         Patient's labs, charting, and events reviewed.  No active medical management issues to address today, the Hospitalist team will continue to follow daily, be available for any  needs, and see or bill for services as needed.      Electronically signed by Juliet Martinez PA-C, 09/10/20, 2:08 PM.

## 2020-09-10 NOTE — PAYOR COMM NOTE
"Elza Rico (19 y.o. Female)     Date of Birth Social Security Number Address Home Phone MRN    2000  [id number]  [facility] [address] [phone] [id number]    Taoism Marital Status          None Single       Admission Date Admission Type Admitting Provider Attending Provider Department, Room/Bed    9/10/20 Emergency Jaclyn Arreaga DO Carroll, Meghan C, DO [facility] 5H, S569/1    Discharge Date Discharge Disposition Discharge Destination                       Attending Provider:  Jaclyn Arreaga DO    Allergies:  No Known Allergies    Isolation:  None   Infection:  None   Code Status:  CPR    Ht:  152.4 cm (60\")   Wt:  54.4 kg (120 lb)    Admission Cmt:  None   Principal Problem:  Urinary tract obstruction by kidney stone [N20.0,N13.8]                 Active Insurance as of 9/10/2020     Primary Coverage     Payor Plan Insurance Group Employer/Plan Group    [employer] PPO [id number]     Payor Plan Address Payor Plan Phone Number Payor Plan Fax Number Effective Dates    [address] [phone]  2008 - None Entered    [place] [address]       Subscriber Name Subscriber Birth Date Member ID       LUIS CARLOS RICO 1970 [id number]                 Emergency Contacts      (Rel.) Home Phone Work Phone Mobile Phone    Nichole Rico (Mother) [phone] -- [phone]               History & Physical      Carla Pearson DO at 09/10/20 [address] [place] [address] Medicine Services  HISTORY AND PHYSICAL    Patient Name: Elza Rico  : 2000  MRN: [id number]  Primary Care Physician: Shonda Chinchilla MD  Date of admission: 9/10/2020      Subjective   Subjective     Chief Complaint:  Left flank pain, abdominal pain    HPI:  Elza Rico is a 19 y.o. female with a history of prior UTI (6 months ago) who presents to the ED from home for left flank and " abdominal pain x 2 weeks. Pt states she has also had dark urine with a foul odor and nausea/vomiting since yesterday.  Denies known fever. She has received dilaudid 0.5 mg IV and states her pain has improved. She is noted to have a , with BP 80s/40s. She does endorse some SOB and dizziness. Accompanied by mother at bedside.    Labs reviewed and significant for BUN 20, Cr 1.44, WBC 14, lactic pending. She has received Rocephin 1 mg IV, 2L NS.  CT A/P shows a 2-3 mm stone in distal left ureter, mild left hydronephrosis.  Urology will be consulted from the ED.    Hospital medicine will admit for further evaluation and treatment.     Review of Systems   Constitutional: Positive for chills and fatigue. Negative for fever.   HENT: Negative.    Eyes: Negative.    Respiratory: Positive for shortness of breath.    Cardiovascular: Negative.    Gastrointestinal: Positive for abdominal pain, nausea and vomiting. Negative for diarrhea.   Endocrine: Negative.    Genitourinary: Positive for dysuria and flank pain.   Skin: Negative.    Allergic/Immunologic: Negative.    Neurological: Negative.    Hematological: Negative.    Psychiatric/Behavioral: Negative.    All other systems reviewed and are negative.         All other systems reviewed and are negative.     Personal History     Past Medical History:   Diagnosis Date   • BENITO (juvenile idiopathic arthritis) (CMS/[facility])    • Mood disorder (CMS/[facility])        History reviewed. No pertinent surgical history.    Family History: family history includes Hypertension in her father; Nephrolithiasis in her father. father has kidney stones    Social History:  reports that she has never smoked. She has never used smokeless tobacco. She reports that she does not drink alcohol or use drugs.  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 Authorizing Provider     HYDROcodone-acetaminophen (NORCO) 5-325 MG per tablet Take 1 tablet by mouth Every 6 (Six) Hours As Needed.    Provider, MD Gilmer   levocetirizine (XYZAL) 5 MG tablet Take 5 mg by mouth Every Evening.    Emergency, Nurse DREA Goodman   methocarbamol (ROBAXIN) 500 MG tablet Take 1 tablet by mouth 3 (Three) Times a Day. 4/12/19   Ludin Darden MD   naproxen (EC NAPROSYN) 500 MG EC tablet Take 1 tablet by mouth 2 (Two) Times a Day With Meals. 8/14/20   Tristian Hamlin MD   sertraline (ZOLOFT) 25 MG tablet Take 25 mg by mouth Daily.    Emergency, Nurse Epic, RN   traZODone (DESYREL) 50 MG tablet Take 50 mg by mouth Every Night.    Emergency, Nurse Epic, RN         No Known Allergies    Objective   Objective     Vital Signs:   Temp:  [98.4 °F (36.9 °C)] 98.4 °F (36.9 °C)  Heart Rate:  [82] 82  Resp:  [16] 16  BP: ()/(44-68) 86/44        Physical Exam   Constitutional: sleepy but arousable  Eyes: PERRLA, sclerae anicteric, injected sclera  HENT: NCAT, mucous membranes moist  Neck: Supple, no thyromegaly, no lymphadenopathy, trachea midline  Respiratory: Clear to auscultation bilaterally, nonlabored respirations   Cardiovascular: tachy, no murmurs, rubs, or gallops, palpable pedal pulses bilaterally  Gastrointestinal: Positive bowel sounds, left flank tenderness  Musculoskeletal: No bilateral ankle edema, no clubbing or cyanosis to extremities  Psychiatric: Appropriate affect, cooperative  Neurologic: Oriented x 3, strength symmetric in all extremities, Cranial Nerves grossly intact to confrontation, speech clear  Skin: No rashes      Results Reviewed:  I have personally reviewed current lab and radiology data.    Results from last 7 days   Lab Units 09/10/20  0100   WBC 10*3/mm3 14.13*   HEMOGLOBIN g/dL 12.0   HEMATOCRIT % 37.5   PLATELETS 10*3/mm3 151     Results from last 7 days   Lab Units 09/10/20  0100   SODIUM mmol/L 135*   POTASSIUM mmol/L 4.7   CHLORIDE mmol/L 99   CO2 mmol/L 24.0    BUN mg/dL 20   CREATININE mg/dL 1.44*   GLUCOSE mg/dL 133*   CALCIUM mg/dL 9.4   ALT (SGPT) U/L 9   AST (SGOT) U/L 18     Estimated Creatinine Clearance: 54 mL/min (A) (by C-G formula based on SCr of 1.44 mg/dL (H)).  Brief Urine Lab Results  (Last result in the past 365 days)      Color   Clarity   Blood   Leuk Est   Nitrite   Protein   CREAT   Urine HCG        09/10/20 0110               Negative         Imaging Results (Last 24 Hours)     Procedure Component Value Units Date/Time    CT Abdomen Pelvis Without Contrast [[id number]] Collected:  09/10/20 0327     Updated:  09/10/20 0329    Narrative:       CT Abdomen Pelvis WO    INDICATION:   Left flank pain for 2 weeks. Urinary tract infection.    TECHNIQUE:   CT of the abdomen and pelvis without IV contrast. Coronal and sagittal reconstructions were obtained.  Radiation dose reduction techniques included automated exposure control or exposure modulation based on body size. Count of known CT and cardiac nuc  med studies performed in previous 12 months: 0.     COMPARISON:   None available.    FINDINGS:  Lung bases are clear. Gallbladder is within normal limits. Multiple small nonobstructing stones are noted in the right kidney. The largest of these measures only 2 to 3 mm. There is left-sided hydronephrosis. There is a 2 to 3 mm calcification in the  left hemipelvis diffuse in meters above the bladder which may be within the distal left ureter. Solid abdominal organs are otherwise normal. Urinary bladder is normal. Solid pelvic organs are grossly normal. Free fluid in the cul-de-sac may be  physiologic. The GI tract is difficult to characterize without contrast. No bowel obstruction is seen. There is nothing to suggest acute colitis. The appendix is normal.      Impression:         1. There is mild left hydronephrosis. A 2 to 3 mm calcification in the left hemipelvis a few cm above the UVJ is probably a distal left ureteral stone.  2. Small nonobstructing stones in  the right kidney.  3. Grossly normal GI tract, including the appendix.  4. Free fluid in the cul-de-sac, likely physiologic finding.          Signer Name: Jayy Goldman MD   Signed: 9/10/2020 3:27 AM   Workstation Name: [id number]    [facility]             Assessment/Plan   Assessment / Plan     Active Hospital Problems    Diagnosis POA   • **Urinary tract obstruction by kidney stone [N20.0, N13.8] Unknown   • Acute pyelonephritis [N10] Unknown   • Hydronephrosis [N13.30] Unknown   • DIANDRA (acute kidney injury) (CMS/HCC) [N17.9] Unknown   • Pyelonephritis [N12] Yes         1. Obstructing left distal ureteral stone with hydronephrosis      Pyelonephritis   - 2-3 mm stone  - urology consult pending  - IV rocephin 1g daily  - Urine culture pending  - blood cultures x 2  - pain control  - I/Os  - NPO until seen this AM  - IVF    2. DIANDRA  - in setting of #1  - aggressive hydration  - renally dose, avoid nephrotoxins    3. Hypotension  - s/p 2L NS in ED, will bolus an additional 1L NS now  - IV  cc/hr  - check stat Lactic acid    DVT prophylaxis:  ambulatory    CODE STATUS:    Code Status and Medical Interventions:   Ordered at: 09/10/20 0449     Code Status:    CPR     Medical Interventions (Level of Support Prior to Arrest):    Full       Admission Status:  I believe this patient meets INPATIENT status due to the need for care which can only be reasonably provided in an hospital setting IV antibiotics, urology consult, pain control.  As such, I feel patient’s risk for adverse outcomes and need for care warrant INPATIENT evaluation and predict the patient’s care encounter to likely last beyond 2 midnights.        Electronically signed by LEIDY Magaña, 09/10/20, 4:37 AM.        Attending   Admission Attestation       I have seen and examined the patient, performing an independent face-to-face diagnostic evaluation with plan of care reviewed and developed with the advanced practice  "clinician (APC).      Brief Summary Statement:   Elza Rico is a 19 y.o. female with past medical history of idiopathic juvenile arthritis who presented to [facility] with worsening left flank pain.  Patient states she had a 2-week history of pain in the left flank and intermittent dysuria.  She developed nausea and vomiting today.  Pain became severe and therefore she came to the ED for further evaluation.  CT scan of the abdomen and pelvis noted  \"mild left hydronephrosis. A 2 to 3 mm calcification in the left hemipelvis a few cm above the UVJ is probably a distal left ureteral stone. And a Small nonobstructing stones in the right kidney.  She was also noted to have an elevated creatinine of 1.44 and WBC count of 14,000.  Therefore the decision was made to admit patient to hospitalist service for IV fluids and IV Rocephin. Urologist Dr Amin was contacted by the ED.       Remainder of detailed HPI is as noted by APC and has been reviewed and/or edited by me for completeness.    Attending Physical Exam:  Constitutional: No acute distress, awake, alert  HENT: NCAT, mucous membranes moist  Respiratory: Clear to auscultation bilaterally, respiratory effort normal   Cardiovascular: RRR, no murmurs, rubs, or gallops, palpable pedal pulses bilaterally  Gastrointestinal: Positive bowel sounds, soft, Left CVA and flank tenderness   Musculoskeletal: No bilateral ankle edema  Psychiatric: Appropriate affect, cooperative  Neurologic: Oriented x 3, strength symmetric in all extremities, Cranial Nerves grossly intact to confrontation, speech clear  Skin: No rashes    Brief Assessment/Plan :  See detailed assessment and plan developed with APC which I have reviewed and/or edited for completeness.    Electronically signed by Carla Pearson DO, 09/10/20, 5:24 AM.                    Electronically signed by Carla Pearson DO at 09/10/20 0531       Emergency Department Notes    No notes of this type exist for " this encounter.         Vital Signs (last day)     Date/Time   Temp   Temp src   Pulse   Resp   BP   Patient Position   SpO2    09/10/20 1100   98.4 (36.9)   Oral   --   17   104/77   Lying   --    09/10/20 0930   (!) 100.8 (38.2)   Oral   --   --   --   --   --    09/10/20 0900   --   --   100   --   --   --   --    09/10/20 0732   (!) 102.2 (39)   Oral   111   16   95/55   Lying   91    09/10/20 0700   --   --   111   --   --   --   --    09/10/20 0645   --   --   114   --   117/75   --   --    09/10/20 0640   (!) 102.9 (39.4)   Oral   --   --   112/74   --   --    09/10/20 0630   (!) 102.8 (39.3)   Oral   --   16   110/71   --   --    09/10/20 0600   --   --   --   --   (!) 83/60   --   93    09/10/20 0530   --   --   --   --   99/60   --   96    09/10/20 0512   --   --   --   --   93/57   --   99    09/10/20 0504   --   --   --   --   (!) 86/44   --   98    09/10/20 0455   --   --   --   --   (!) 86/46   --   94    09/10/20 0330   --   --   --   --   96/54   --   97    09/10/20 0321   --   --   --   --   90/48   --   98    09/09/20 2324   98.4 (36.9)   --   82   16   108/68   --   98                Facility-Administered Medications as of 9/10/2020   Medication Dose Route Frequency Provider Last Rate Last Dose   • acetaminophen (TYLENOL) tablet 650 mg  650 mg Oral Q4H PRN Carla Pearson DO   650 mg at 09/10/20 0657    Or   • acetaminophen (TYLENOL) 160 MG/5ML solution 650 mg  650 mg Oral Q4H PRN Carla Pearson DO        Or   • acetaminophen (TYLENOL) suppository 650 mg  650 mg Rectal Q4H PRN Carla Pearson DO       • bisacodyl (DULCOLAX) EC tablet 5 mg  5 mg Oral Daily PRN Carla Pearson DO       • [COMPLETED] cefTRIAXone (ROCEPHIN) 1 g/100 mL 0.9% NS (MBP)  1 g Intravenous Once Ramiro Reyes DO   Stopped at 09/10/20 0506   • cefTRIAXone (ROCEPHIN) 1 g/100 mL 0.9% NS (MBP)  1 g Intravenous Nightly Nellie Blum PA       • [COMPLETED] HYDROmorphone (DILAUDID) injection 0.5 mg  0.5 mg Intravenous Q10 Min PRN  Ramiro Reyes, DO   0.5 mg at 09/10/20 0649   • morphine injection 2 mg  2 mg Intravenous Q4H PRN Carla Pearson DO        And   • naloxone (NARCAN) injection 0.4 mg  0.4 mg Intravenous Q5 Min PRN Carla Pearson DO       • [COMPLETED] ondansetron (ZOFRAN) injection 4 mg  4 mg Intravenous Once Ramiro Reyes, DO   4 mg at 09/10/20 0308   • ondansetron (ZOFRAN) injection 4 mg  4 mg Intravenous Q6H PRN Carla Pearson DO       • sertraline (ZOLOFT) tablet 25 mg  25 mg Oral Daily Carla Pearson DO   25 mg at 09/10/20 0928   • [COMPLETED] sodium chloride 0.9 % bolus 1,000 mL  1,000 mL Intravenous Once Ramiro Reyes, DO   Stopped at 09/10/20 0645   • [COMPLETED] sodium chloride 0.9 % bolus 2,000 mL  2,000 mL Intravenous Once Ramiro Reyes DO   Stopped at 09/10/20 0506   • sodium chloride 0.9 % flush 10 mL  10 mL Intravenous PRN Ramiro Reyes DO       • sodium chloride 0.9 % flush 10 mL  10 mL Intravenous Q12H Carla Pearson DO   10 mL at 09/10/20 0928   • sodium chloride 0.9 % flush 10 mL  10 mL Intravenous PRN Carla Pearson DO       • sodium chloride 0.9 % infusion  125 mL/hr Intravenous Continuous Carla Pearson  mL/hr at 09/10/20 0649 125 mL/hr at 09/10/20 0649   • tamsulosin (FLOMAX) 24 hr capsule 0.4 mg  0.4 mg Oral Daily Jaclyn Arreaga DO   0.4 mg at 09/10/20 0928   • traZODone (DESYREL) tablet 50 mg  50 mg Oral Nightly Carla Pearson DO

## 2020-09-10 NOTE — ANESTHESIA PROCEDURE NOTES
Airway  Urgency: elective    Date/Time: 9/10/2020 3:08 PM  Airway not difficult    General Information and Staff    Patient location during procedure: OR  CRNA: Pam Krueger CRNA    Indications and Patient Condition  Indications for airway management: airway protection    Preoxygenated: yes  MILS maintained throughout  Mask difficulty assessment: 1 - vent by mask    Final Airway Details  Final airway type: supraglottic airway      Successful airway: I-gel  Size 4    Number of attempts at approach: 1  Assessment: lips, teeth, and gum same as pre-op and atraumatic intubation    Additional Comments  Pt to OR  7. Pt moved self to OR table. ASA monitors applied. Pre-O2 with 100%. SIVI. LMA placed atraumatic. +ETCO2. +BBS.

## 2020-09-10 NOTE — NURSING NOTE
"Received from ED, report received from Belem SHEPARD, pt arrived with temp of 102.9 oral and c/o pain. Tylenol and dilaudid given as ordered, BP stable at 117/75. Pt tachycardic in 110\"s. Will report off to oncoming RN. Pt currently reports improved pain with dilaudid.   "

## 2020-09-10 NOTE — BRIEF OP NOTE
CYSTOSCOPY URETERAL CATHETER/STENT INSERTION  Progress Note    Elza Rico  9/10/2020    Pre-op Diagnosis:   Left ureteral calculus and urinary tract infection       Post-Op Diagnosis Codes:  Same    Procedure/CPT® Codes:        Procedure(s):  CYSTOSCOPY, LEFT URETEROSCOPY, STONE BASKETING AND LEFT URETERAL STENT PLACEMENT    Surgeon(s):  Rashad Amin MD    Anesthesia: General    Staff:   Circulator: Goldie Palomares RN  Radiology Technologist: Jayy Gil RT  Scrub Person: Elizabeth Serrato         Estimated Blood Loss: none    Urine Voided: * No values recorded between 9/10/2020  3:02 PM and 9/10/2020  3:42 PM *    Specimens:                Specimens     ID Source Type Tests Collected By Collected At Frozen?      1 Ureter, Left Calculus · STONE ANALYSIS   Rashad Amin MD 9/10/20 1532      This specimen was not marked as sent.    2 Urine, Catheter Urine · URINE CULTURE   Rashad Amin MD 9/10/20 1541      This specimen was not marked as sent.                Drains: * No LDAs found *    Findings: Small 1 to 2 mm left distal stone.    Complications: None, to recovery room stable          Rashad Amin MD     Date: 9/10/2020  Time: 15:43

## 2020-09-10 NOTE — PLAN OF CARE
Pt underwent a cystoscopy and stent placement this afternoon. She is  bit out of sorts, but answers questions appropriately. Pt heart rate is wnl. Her temp was up in PACU, and coming back down. Pt urinated once she got to the floor. Will continue to monitor pt at this time.

## 2020-09-10 NOTE — OP NOTE
Preop diagnosis: Left ureteral calculus and urinary tract infection  Postoperative diagnosis: Same  Surgeon: Eloisa  Anesthesia: General  Procedure performed: cystoscopy left ureteroscopic basket extraction and stent insertion (4.8 x 24 cm)    Indications: This is a 19-year-old white female was admitted in the middle the night with left renal colic and abdominal pain fever chills.  She has an elevated white count was put on IV antibiotic therapy CT scan shows just a 2 mm stone in her left distal ureter with hydronephrosis on that side.  He is considered continued to have some tachycardia and low-grade fevers and has consented for operative stent insertion and possible stone extraction.    Operative description: Patient was placed operating table in the dorsolithotomy position.  Groin was prepped and draped in usual sterile fashion and general endotracheal anesthesia was administered.  IV gentamicin was administered she had already had Rocephin on board from earlier this morning.  The 21 [place] [employer]I panendoscope was inserted under video cystoscopy.  The urethra was inspected and noted be normal.  The right orifice was in its normal [place]ian effluxing clear urine.  I saw no effluent coming from the left orifice.  She had a lot of squamous metaplasia of the trigone.  Her bladder was inspected circumferentially with a 30 and 70 degree lenses there were no other lesions tumors trabeculations or other normalities.  The left orifice was cannulated with a sensor wire which was passed fluoroscopically up to the kidney.  I could feel a stone in the distal ureter.  I thought that since everything was going well and she was stable that I may try to extract the stone.  Therefore I removed the cystoscope and inserted the ACMI mini rigid ureteroscope.  With the assistance of a nitinol basket I advanced it up the distal ureter.  The stone was engaged in the basket and removed on first pass.  She then demonstrated a hydronephrotic  drip and I drained off some of the fluid from the kidney and sent that for intraoperative cultures.    I then removed the ureteroscope and reinserted the cystoscope.  I placed a 4.8 x 24 cm double-J stent.  There was a good curl in the kidney and bladder.  The string was knotted cut and tucked into the vagina.  Her bladder was drained and the scope was removed atraumatically.  2% Xylocaine gel was injected intraurethrally.  She was awakened and taken to recovery in stable condition.  There were no complications.    The hospital dictation system is broken.  This was done a voice recognition system.  There may be significant transcription errors.

## 2020-09-10 NOTE — ANESTHESIA POSTPROCEDURE EVALUATION
Patient: Elza Rico    Procedure Summary     Date:  09/10/20 Room / Location:   [facility] 07 /  [facility]    Anesthesia Start:  1502 Anesthesia Stop:      Procedure:  CYSTOSCOPY, LEFT URETEROSCOPY, STONE BASKETING AND LEFT URETERAL STENT PLACEMENT (Left Bladder) Diagnosis:      Surgeon:  Rashad Amin MD Provider:  Ty Randhawa MD    Anesthesia Type:  general ASA Status:  2          Anesthesia Type: general    Vitals  Vitals Value Taken Time   /53 9/10/2020  3:46 PM   Temp 101.5 °F (38.6 °C) 9/10/2020  3:46 PM   Pulse 80 9/10/2020  3:46 PM   Resp 14 9/10/2020  3:46 PM   SpO2 100 % 9/10/2020  3:46 PM           Post Anesthesia Care and Evaluation    Patient location during evaluation: PACU  Patient participation: complete - patient cannot participate  Level of consciousness: responsive to physical stimuli  Pain score: 0  Pain management: adequate  Airway patency: patent  Anesthetic complications: No anesthetic complications    Cardiovascular status: stable  Respiratory status: acceptable, nasal cannula, oral airway and spontaneous ventilation  Hydration status: stable    Comments: Pt transferred to PACU with O2. Vital signs stable. Report to PACU RN and care accepted.

## 2020-09-10 NOTE — PROGRESS NOTES
Discharge Planning Assessment  [facility]     Patient Name: Elza Rico  MRN: [id number]  Today's Date: 9/10/2020    Admit Date: 9/10/2020    Discharge Needs Assessment     Row Name 09/10/20 1453       Living Environment    Lives With  parent(s)    Name(s) of Who Lives With Patient  Currently staying with parents    Current Living Arrangements  home/apartment/condo    Primary Care Provided by  self;parent(s)    Provides Primary Care For  no one    Family Caregiver if Needed  parent(s)    Quality of Family Relationships  helpful;involved;supportive    Able to Return to Prior Arrangements  yes    Living Arrangement Comments  Pt sleeping when CM visited room. CM spoke with pt's father, Francis.  Pt goes to college, but has been staying with her parents. Plan is home with parents.  CM will cont to follow.        Resource/Environmental Concerns    Resource/Environmental Concerns  none       Transition Planning    Patient/Family Anticipates Transition to  home with family    Transportation Anticipated  family or friend will provide       Discharge Needs Assessment    Readmission Within the Last 30 Days  no previous admission in last 30 days    Concerns to be Addressed  no discharge needs identified    Equipment Currently Used at Home  none    Equipment Needed After Discharge  none    Discharge Coordination/Progress  Pt has [employer] with prescription coverage. Pt uses [facility]. Plan is home with family. Pt's father denies discharge needs.  CM will cont to follow.        Discharge Plan     Row Name 09/10/20 1458       Plan    Plan  discharge plan    Patient/Family in Agreement with Plan  yes    Plan Comments  Plan is home with family. Pt's father denies discharge needs.  CM will cont to follow.    Final Discharge Disposition Code  01 - home or self-care        Destination      Coordination has not been started for this encounter.      Durable Medical Equipment      Coordination  has not been started for this encounter.      Dialysis/Infusion      Coordination has not been started for this encounter.      Home Medical Care      Coordination has not been started for this encounter.      Therapy      Coordination has not been started for this encounter.      Community Resources      Coordination has not been started for this encounter.        Expected Discharge Date and Time     Expected Discharge Date Expected Discharge Time    Sep 12, 2020         Demographic Summary     Row Name 09/10/20 1452       General Information    General Information Comments  Pt's PCP is Shonda Chinchlila.        Contact Information    Permission Granted to Share Info With      Contact Information Obtained for      Contact Information Comments  Nichole Rico(mother):  [phone]        Functional Status    No documentation.       Psychosocial    No documentation.       Abuse/Neglect    No documentation.       Legal    No documentation.       Substance Abuse    No documentation.       Patient Forms    No documentation.           Lita Suero RN

## 2020-09-10 NOTE — H&P
[facility] Medicine Services  HISTORY AND PHYSICAL    Patient Name: Elza Rico  : 2000  MRN: [id number]  Primary Care Physician: Shonda Chinchilla MD  Date of admission: 9/10/2020      Subjective   Subjective     Chief Complaint:  Left flank pain, abdominal pain    HPI:  Elza Rico is a 19 y.o. female with a history of prior UTI (6 months ago) who presents to the ED from home for left flank and abdominal pain x 2 weeks. Pt states she has also had dark urine with a foul odor and nausea/vomiting since yesterday.  Denies known fever. She has received dilaudid 0.5 mg IV and states her pain has improved. She is noted to have a , with BP 80s/40s. She does endorse some SOB and dizziness. Accompanied by mother at bedside.    Labs reviewed and significant for BUN 20, Cr 1.44, WBC 14, lactic pending. She has received Rocephin 1 mg IV, 2L NS.  CT A/P shows a 2-3 mm stone in distal left ureter, mild left hydronephrosis.  Urology will be consulted from the ED.    Hospital medicine will admit for further evaluation and treatment.     Review of Systems   Constitutional: Positive for chills and fatigue. Negative for fever.   HENT: Negative.    Eyes: Negative.    Respiratory: Positive for shortness of breath.    Cardiovascular: Negative.    Gastrointestinal: Positive for abdominal pain, nausea and vomiting. Negative for diarrhea.   Endocrine: Negative.    Genitourinary: Positive for dysuria and flank pain.   Skin: Negative.    Allergic/Immunologic: Negative.    Neurological: Negative.    Hematological: Negative.    Psychiatric/Behavioral: Negative.    All other systems reviewed and are negative.         All other systems reviewed and are negative.     Personal History     Past Medical History:   Diagnosis Date   • BENITO (juvenile idiopathic arthritis) (CMS/HCC)    • Mood disorder (CMS/HCC)        History reviewed. No pertinent surgical history.    Family History: family  history includes Hypertension in her father; Nephrolithiasis in her father. father has kidney stones    Social History:  reports that she has never smoked. She has never used smokeless tobacco. She reports that she does not drink alcohol or use drugs.  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 Authorizing Provider   HYDROcodone-acetaminophen (NORCO) 5-325 MG per tablet Take 1 tablet by mouth Every 6 (Six) Hours As Needed.    Provider, MD Gilmer   levocetirizine (XYZAL) 5 MG tablet Take 5 mg by mouth Every Evening.    Emergency, Nurse DREA Goodman   methocarbamol (ROBAXIN) 500 MG tablet Take 1 tablet by mouth 3 (Three) Times a Day. 4/12/19   Ludin Darden MD   naproxen (EC NAPROSYN) 500 MG EC tablet Take 1 tablet by mouth 2 (Two) Times a Day With Meals. 8/14/20   Tristian Hamlin MD   sertraline (ZOLOFT) 25 MG tablet Take 25 mg by mouth Daily.    Emergency, Nurse Epic, RN   traZODone (DESYREL) 50 MG tablet Take 50 mg by mouth Every Night.    Emergency, Nurse Epic, RN         No Known Allergies    Objective   Objective     Vital Signs:   Temp:  [98.4 °F (36.9 °C)] 98.4 °F (36.9 °C)  Heart Rate:  [82] 82  Resp:  [16] 16  BP: ()/(44-68) 86/44        Physical Exam   Constitutional: sleepy but arousable  Eyes: PERRLA, sclerae anicteric, injected sclera  HENT: NCAT, mucous membranes moist  Neck: Supple, no thyromegaly, no lymphadenopathy, trachea midline  Respiratory: Clear to auscultation bilaterally, nonlabored respirations   Cardiovascular: tachy, no murmurs, rubs, or gallops, palpable pedal pulses bilaterally  Gastrointestinal: Positive bowel sounds, left flank tenderness  Musculoskeletal: No bilateral ankle edema, no clubbing or cyanosis to extremities  Psychiatric: Appropriate affect, cooperative  Neurologic: Oriented x 3, strength symmetric in all  extremities, Cranial Nerves grossly intact to confrontation, speech clear  Skin: No rashes      Results Reviewed:  I have personally reviewed current lab and radiology data.    Results from last 7 days   Lab Units 09/10/20  0100   WBC 10*3/mm3 14.13*   HEMOGLOBIN g/dL 12.0   HEMATOCRIT % 37.5   PLATELETS 10*3/mm3 151     Results from last 7 days   Lab Units 09/10/20  0100   SODIUM mmol/L 135*   POTASSIUM mmol/L 4.7   CHLORIDE mmol/L 99   CO2 mmol/L 24.0   BUN mg/dL 20   CREATININE mg/dL 1.44*   GLUCOSE mg/dL 133*   CALCIUM mg/dL 9.4   ALT (SGPT) U/L 9   AST (SGOT) U/L 18     Estimated Creatinine Clearance: 54 mL/min (A) (by C-G formula based on SCr of 1.44 mg/dL (H)).  Brief Urine Lab Results  (Last result in the past 365 days)      Color   Clarity   Blood   Leuk Est   Nitrite   Protein   CREAT   Urine HCG        09/10/20 0110               Negative         Imaging Results (Last 24 Hours)     Procedure Component Value Units Date/Time    CT Abdomen Pelvis Without Contrast [[id number]] Collected:  09/10/20 0327     Updated:  09/10/20 0329    Narrative:       CT Abdomen Pelvis WO    INDICATION:   Left flank pain for 2 weeks. Urinary tract infection.    TECHNIQUE:   CT of the abdomen and pelvis without IV contrast. Coronal and sagittal reconstructions were obtained.  Radiation dose reduction techniques included automated exposure control or exposure modulation based on body size. Count of known CT and cardiac nuc  med studies performed in previous 12 months: 0.     COMPARISON:   None available.    FINDINGS:  Lung bases are clear. Gallbladder is within normal limits. Multiple small nonobstructing stones are noted in the right kidney. The largest of these measures only 2 to 3 mm. There is left-sided hydronephrosis. There is a 2 to 3 mm calcification in the  left hemipelvis diffuse in meters above the bladder which may be within the distal left ureter. Solid abdominal organs are otherwise normal. Urinary bladder is normal.  Solid pelvic organs are grossly normal. Free fluid in the cul-de-sac may be  physiologic. The GI tract is difficult to characterize without contrast. No bowel obstruction is seen. There is nothing to suggest acute colitis. The appendix is normal.      Impression:         1. There is mild left hydronephrosis. A 2 to 3 mm calcification in the left hemipelvis a few cm above the UVJ is probably a distal left ureteral stone.  2. Small nonobstructing stones in the right kidney.  3. Grossly normal GI tract, including the appendix.  4. Free fluid in the cul-de-sac, likely physiologic finding.          Signer Name: Jayy Goldman MD   Signed: 9/10/2020 3:27 AM   Workstation Name: [facility]    [facility]             Assessment/Plan   Assessment / Plan     Active Hospital Problems    Diagnosis POA   • **Urinary tract obstruction by kidney stone [N20.0, N13.8] Unknown   • Acute pyelonephritis [N10] Unknown   • Hydronephrosis [N13.30] Unknown   • DIANDRA (acute kidney injury) (CMS/HCC) [N17.9] Unknown   • Pyelonephritis [N12] Yes         1. Obstructing left distal ureteral stone with hydronephrosis      Pyelonephritis   - 2-3 mm stone  - urology consult pending  - IV rocephin 1g daily  - Urine culture pending  - blood cultures x 2  - pain control  - I/Os  - NPO until seen this AM  - IVF    2. DIANDRA  - in setting of #1  - aggressive hydration  - renally dose, avoid nephrotoxins    3. Hypotension  - s/p 2L NS in ED, will bolus an additional 1L NS now  - IV  cc/hr  - check stat Lactic acid    DVT prophylaxis:  ambulatory    CODE STATUS:    Code Status and Medical Interventions:   Ordered at: 09/10/20 0449     Code Status:    CPR     Medical Interventions (Level of Support Prior to Arrest):    Full       Admission Status:  I believe this patient meets INPATIENT status due to the need for care which can only be reasonably provided in an hospital setting IV antibiotics, urology consult, pain control.  As  "such, I feel patient’s risk for adverse outcomes and need for care warrant INPATIENT evaluation and predict the patient’s care encounter to likely last beyond 2 midnights.        Electronically signed by LEIDY Magaña, 09/10/20, 4:37 AM.        Attending   Admission Attestation       I have seen and examined the patient, performing an independent face-to-face diagnostic evaluation with plan of care reviewed and developed with the advanced practice clinician (APC).      Brief Summary Statement:   Elza Rico is a 19 y.o. female with past medical history of idiopathic juvenile arthritis who presented to [facility] with worsening left flank pain.  Patient states she had a 2-week history of pain in the left flank and intermittent dysuria.  She developed nausea and vomiting today.  Pain became severe and therefore she came to the ED for further evaluation.  CT scan of the abdomen and pelvis noted  \"mild left hydronephrosis. A 2 to 3 mm calcification in the left hemipelvis a few cm above the UVJ is probably a distal left ureteral stone. And a Small nonobstructing stones in the right kidney.  She was also noted to have an elevated creatinine of 1.44 and WBC count of 14,000.  Therefore the decision was made to admit patient to hospitalist service for IV fluids and IV Rocephin. Urologist Dr Amin was contacted by the ED.       Remainder of detailed HPI is as noted by APC and has been reviewed and/or edited by me for completeness.    Attending Physical Exam:  Constitutional: No acute distress, awake, alert  HENT: NCAT, mucous membranes moist  Respiratory: Clear to auscultation bilaterally, respiratory effort normal   Cardiovascular: RRR, no murmurs, rubs, or gallops, palpable pedal pulses bilaterally  Gastrointestinal: Positive bowel sounds, soft, Left CVA and flank tenderness   Musculoskeletal: No bilateral ankle edema  Psychiatric: Appropriate affect, cooperative  Neurologic: Oriented x 3, strength " symmetric in all extremities, Cranial Nerves grossly intact to confrontation, speech clear  Skin: No rashes    Brief Assessment/Plan :  See detailed assessment and plan developed with APC which I have reviewed and/or edited for completeness.    Electronically signed by Carla Pearson DO, 09/10/20, 5:24 AM.

## 2020-09-11 LAB
ANION GAP SERPL CALCULATED.3IONS-SCNC: 10 MMOL/L (ref 5–15)
BASOPHILS # BLD MANUAL: 0 10*3/MM3 (ref 0–0.2)
BASOPHILS NFR BLD AUTO: 0 % (ref 0–1.5)
BUN SERPL-MCNC: 21 MG/DL (ref 6–20)
BUN/CREAT SERPL: 18.4 (ref 7–25)
CALCIUM SPEC-SCNC: 8.2 MG/DL (ref 8.6–10.5)
CHLORIDE SERPL-SCNC: 105 MMOL/L (ref 98–107)
CO2 SERPL-SCNC: 20 MMOL/L (ref 22–29)
CREAT SERPL-MCNC: 1.14 MG/DL (ref 0.57–1)
DEPRECATED RDW RBC AUTO: 47.8 FL (ref 37–54)
EOSINOPHIL # BLD MANUAL: 0 10*3/MM3 (ref 0–0.4)
EOSINOPHIL NFR BLD MANUAL: 0 % (ref 0.3–6.2)
ERYTHROCYTE [DISTWIDTH] IN BLOOD BY AUTOMATED COUNT: 15 % (ref 12.3–15.4)
GFR SERPL CREATININE-BSD FRML MDRD: 61 ML/MIN/1.73
GLUCOSE SERPL-MCNC: 121 MG/DL (ref 65–99)
HCT VFR BLD AUTO: 33.3 % (ref 34–46.6)
HGB BLD-MCNC: 10.4 G/DL (ref 12–15.9)
LYMPHOCYTES # BLD MANUAL: 0.46 10*3/MM3 (ref 0.7–3.1)
LYMPHOCYTES NFR BLD MANUAL: 4 % (ref 19.6–45.3)
LYMPHOCYTES NFR BLD MANUAL: 5 % (ref 5–12)
MCH RBC QN AUTO: 26.9 PG (ref 26.6–33)
MCHC RBC AUTO-ENTMCNC: 31.2 G/DL (ref 31.5–35.7)
MCV RBC AUTO: 86.3 FL (ref 79–97)
METAMYELOCYTES NFR BLD MANUAL: 2 % (ref 0–0)
MONOCYTES # BLD AUTO: 0.58 10*3/MM3 (ref 0.1–0.9)
NEUTROPHILS # BLD AUTO: 10.1 10*3/MM3 (ref 1.7–7)
NEUTROPHILS NFR BLD MANUAL: 82 % (ref 42.7–76)
NEUTS BAND NFR BLD MANUAL: 5 % (ref 0–5)
PLAT MORPH BLD: NORMAL
PLATELET # BLD AUTO: 81 10*3/MM3 (ref 140–450)
PMV BLD AUTO: 11.5 FL (ref 6–12)
POTASSIUM SERPL-SCNC: 4.7 MMOL/L (ref 3.5–5.2)
RBC # BLD AUTO: 3.86 10*6/MM3 (ref 3.77–5.28)
RBC MORPH BLD: NORMAL
SODIUM SERPL-SCNC: 135 MMOL/L (ref 136–145)
VARIANT LYMPHS NFR BLD MANUAL: 2 % (ref 0–5)
WBC # BLD AUTO: 11.61 10*3/MM3 (ref 3.4–10.8)
WBC MORPH BLD: NORMAL

## 2020-09-11 PROCEDURE — 25010000002 MORPHINE PER 10 MG: Performed by: UROLOGY

## 2020-09-11 PROCEDURE — 99232 SBSQ HOSP IP/OBS MODERATE 35: CPT | Performed by: INTERNAL MEDICINE

## 2020-09-11 PROCEDURE — 80048 BASIC METABOLIC PNL TOTAL CA: CPT | Performed by: UROLOGY

## 2020-09-11 PROCEDURE — 85007 BL SMEAR W/DIFF WBC COUNT: CPT | Performed by: UROLOGY

## 2020-09-11 PROCEDURE — 85025 COMPLETE CBC W/AUTO DIFF WBC: CPT | Performed by: UROLOGY

## 2020-09-11 PROCEDURE — 25010000002 CEFTRIAXONE PER 250 MG: Performed by: UROLOGY

## 2020-09-11 PROCEDURE — 25010000002 ONDANSETRON PER 1 MG: Performed by: UROLOGY

## 2020-09-11 RX ADMIN — TRAZODONE HYDROCHLORIDE 50 MG: 50 TABLET ORAL at 20:28

## 2020-09-11 RX ADMIN — ONDANSETRON 4 MG: 2 INJECTION INTRAMUSCULAR; INTRAVENOUS at 17:45

## 2020-09-11 RX ADMIN — ACETAMINOPHEN 650 MG: 325 TABLET, FILM COATED ORAL at 04:56

## 2020-09-11 RX ADMIN — ACETAMINOPHEN 650 MG: 325 TABLET, FILM COATED ORAL at 17:38

## 2020-09-11 RX ADMIN — CEFTRIAXONE SODIUM 1 G: 1 INJECTION, POWDER, FOR SOLUTION INTRAMUSCULAR; INTRAVENOUS at 20:28

## 2020-09-11 RX ADMIN — SERTRALINE HYDROCHLORIDE 25 MG: 25 TABLET ORAL at 08:59

## 2020-09-11 RX ADMIN — SODIUM CHLORIDE, PRESERVATIVE FREE 10 ML: 5 INJECTION INTRAVENOUS at 20:28

## 2020-09-11 RX ADMIN — MORPHINE SULFATE 2 MG: 2 INJECTION, SOLUTION INTRAMUSCULAR; INTRAVENOUS at 02:33

## 2020-09-11 RX ADMIN — ACETAMINOPHEN 650 MG: 325 TABLET, FILM COATED ORAL at 09:03

## 2020-09-11 NOTE — PLAN OF CARE
Problem: Patient Care Overview  Goal: Plan of Care Review  Outcome: Ongoing (interventions implemented as appropriate)  Flowsheets (Taken 9/11/2020 5095)  Progress: improving  Plan of Care Reviewed With: patient; mother     Problem: Patient Care Overview  Goal: Individualization and Mutuality  Outcome: Ongoing (interventions implemented as appropriate)     Problem: Patient Care Overview  Goal: Discharge Needs Assessment  Outcome: Ongoing (interventions implemented as appropriate)     Problem: Patient Care Overview  Goal: Interprofessional Rounds/Family Conf  Outcome: Ongoing (interventions implemented as appropriate)     Problem: Sepsis/Septic Shock (Adult)  Goal: Signs and Symptoms of Listed Potential Problems Will be Absent, Minimized or Managed (Sepsis/Septic Shock)  Outcome: Ongoing (interventions implemented as appropriate)     Problem: Renal Failure/Kidney Injury, Acute (Adult)  Goal: Signs and Symptoms of Listed Potential Problems Will be Absent, Minimized or Managed (Renal Failure/Kidney Injury, Acute)  Outcome: Ongoing (interventions implemented as appropriate)

## 2020-09-11 NOTE — PROGRESS NOTES
[facility] Medicine Services  PROGRESS NOTE    Patient Name: Elza Rico  : 2000  MRN: [id number]    Date of Admission: 9/10/2020  Primary Care Physician: Shonda Chinchilla MD    Subjective   Subjective     CC:  Kidney stone     HPI:  Mom at bedside, patient sleeping did not arouse.  Mom says that she was able to eat breakfast without any issues    Review of Systems  Doing okay per mother at bedside    Objective   Objective     Vital Signs:   Temp:  [98.2 °F (36.8 °C)-101.5 °F (38.6 °C)] 98.6 °F (37 °C)  Heart Rate:  [] 81  Resp:  [14-20] 18  BP: ()/(47-73) 104/65        Physical Exam:  Constitutional: No acute distress, resting  HENT: NCAT, mucous membranes moist  Respiratory: Clear to auscultation bilaterally, respiratory effort normal   Cardiovascular: RRR, no murmurs  Gastrointestinal: Positive bowel sounds, soft, nontender, nondistended  Musculoskeletal: No edema  Skin: No rashes    Results Reviewed:  Results from last 7 days   Lab Units 20  0553 09/10/20  0100   WBC 10*3/mm3 11.61* 14.13*   HEMOGLOBIN g/dL 10.4* 12.0   HEMATOCRIT % 33.3* 37.5   PLATELETS 10*3/mm3 81* 151   PROCALCITONIN ng/mL  --  40.31*     Results from last 7 days   Lab Units 20  0553 09/10/20  0100   SODIUM mmol/L 135* 135*   POTASSIUM mmol/L 4.7 4.7   CHLORIDE mmol/L 105 99   CO2 mmol/L 20.0* 24.0   BUN mg/dL 21* 20   CREATININE mg/dL 1.14* 1.44*   GLUCOSE mg/dL 121* 133*   CALCIUM mg/dL 8.2* 9.4   ALT (SGPT) U/L  --  9   AST (SGOT) U/L  --  18     Estimated Creatinine Clearance: 68.2 mL/min (A) (by C-G formula based on SCr of 1.14 mg/dL (H)).    Microbiology Results Abnormal     Procedure Component Value - Date/Time    Urine Culture - Urine, Urine, Catheter [[id number]]  (Abnormal) Collected:  09/10/20 0723    Lab Status:  Preliminary result Specimen:  Urine, Catheter Updated:  20 1220     Urine Culture 25,000 CFU/mL Escherichia coli    COVID-19, [facility] IN-HOUSE,NP  Swab (NO TRANSPORT MEDIA) 2 HR TAT - Swab, Nasopharynx [[id number]]  (Normal) Collected:  09/10/20 0509    Lab Status:  Final result Specimen:  Swab from Nasopharynx Updated:  09/10/20 0605     COVID19 Not Detected    Narrative:       Fact sheet for providers: https://www.fda.gov/media/372689/download     Fact sheet for patients: https://www.fda.gov/media/242118/download          Imaging Results (Last 24 Hours)     Procedure Component Value Units Date/Time    FL C Arm During Surgery [[id number]] Collected:  09/11/20 0742     Updated:  09/11/20 0815    Narrative:       EXAMINATION: FLUOROSCOPY C-ARM DURING SURGERY-     INDICATION: Cystoscopy; N20.0-Calculus of kidney.      TECHNIQUE: Intraoperative fluoroscopy for improved localization and  treatment planning.     COMPARISON: None.     FINDINGS: Intraoperative fluoroscopy with total fluoroscopic time usage  18 seconds and two images saved during cystoscopy with left ureteral  stent placement.       Impression:       Intraoperative fluoroscopy utilized during cystoscopy with  left ureteral stent placement.     D:  09/11/2020  E:  09/11/2020                      I have reviewed the medications:  Scheduled Meds:  cefTRIAXone 1 g Intravenous Nightly   sertraline 25 mg Oral Daily   sodium chloride 10 mL Intravenous Q12H   traZODone 50 mg Oral Nightly     Continuous Infusions:  lactated ringers 9 mL/hr   lactated ringers 9 mL/hr     PRN Meds:.•  acetaminophen **OR** acetaminophen **OR** acetaminophen  •  bisacodyl  •  lactated ringers  •  Morphine **AND** naloxone  •  ondansetron  •  sodium chloride  •  sodium chloride    Assessment/Plan   Assessment & Plan     Active Hospital Problems    Diagnosis  POA   • **Urinary tract obstruction by kidney stone [N20.0, N13.8]  Unknown   • Acute pyelonephritis [N10]  Unknown   • Hydronephrosis [N13.30]  Unknown   • DIANDRA (acute kidney injury) (CMS/HCC) [N17.9]  Unknown   • Pyelonephritis [N12]  Yes      Resolved Hospital Problems   No  resolved problems to display.        Brief Hospital Course to date:  Elza Rico is a 19 y.o. female with no significant medical history admitted for sepsis secondary to obstructing left ureteral stone.  She underwent stone retrieval and placement of ureteral stent on 9/10 with Dr. Amin.    Obstructing left distal ureteral stone with hydronephrosis  - 2-3 mm stone, s/p removal and placement of ureteral stent  - Urine culture with E. coli, JAYESH is still pending  - blood cultures x 2  - pain control  - I/Os     DIANDRA  - in setting of #1  - Back to baseline          DVT Prophylaxis: Mechanical      Disposition: I expect the patient to be discharged tomorrow with p.o. antibiotics    CODE STATUS:   Code Status and Medical Interventions:   Ordered at: 09/10/20 0449     Code Status:    CPR     Medical Interventions (Level of Support Prior to Arrest):    Full         Electronically signed by Jaclyn Arreaga DO, 09/11/20, 14:27.

## 2020-09-11 NOTE — PROGRESS NOTES
T-max overnight 99.4        Physical Exam:   General Appearance: alert, appears stated age and cooperative  Head: normocephalic, without obvious abnormality and atraumatic  Lungs respirations regular  Abdomen no masses and soft non tender    Extremities moves extremities well, no edema, no cyanosis and no redness      Lab Results (last 24 hours)     Procedure Component Value Units Date/Time    Basic Metabolic Panel [[id number]]  (Abnormal) Collected:  09/11/20 0553    Specimen:  Blood Updated:  09/11/20 0707     Glucose 121 mg/dL      BUN 21 mg/dL      Creatinine 1.14 mg/dL      Sodium 135 mmol/L      Potassium 4.7 mmol/L      Chloride 105 mmol/L      CO2 20.0 mmol/L      Calcium 8.2 mg/dL      eGFR Non African Amer 61 mL/min/1.73      BUN/Creatinine Ratio 18.4     Anion Gap 10.0 mmol/L     Narrative:       GFR Normal >60  Chronic Kidney Disease <60  Kidney Failure <15      CBC Auto Differential [[id number]]  (Abnormal) Collected:  09/11/20 0553    Specimen:  Blood Updated:  09/11/20 0654     WBC 11.61 10*3/mm3      RBC 3.86 10*6/mm3      Hemoglobin 10.4 g/dL      Hematocrit 33.3 %      MCV 86.3 fL      MCH 26.9 pg      MCHC 31.2 g/dL      RDW 15.0 %      RDW-SD 47.8 fl      MPV 11.5 fL      Platelets 81 10*3/mm3     Scan Slide [[id number]] Collected:  09/11/20 0553    Specimen:  Blood Updated:  09/11/20 0635    CBC & Differential [[id number]] Collected:  09/11/20 0553    Specimen:  Blood Updated:  09/11/20 0609    Narrative:       The following orders were created for panel order CBC & Differential.  Procedure                               Abnormality         Status                     ---------                               -----------         ------                     CBC Auto Differential[[id number]]        Abnormal            Preliminary result           Please view results for these tests on the individual orders.    STONE ANALYSIS - Calculus, Ureter, Left [[id number]] Collected:  09/10/20 1532    Specimen:   Calculus from Ureter, Left Updated:  09/10/20 1549    Urine Culture - Urine, Urine, Catheter [[id number]] Collected:  09/10/20 1541    Specimen:  Urine, Catheter Updated:  09/10/20 1548    Hemoglobin A1c [[id number]]  (Abnormal) Collected:  09/10/20 0100    Specimen:  Blood Updated:  09/10/20 0836     Hemoglobin A1C 5.70 %     Narrative:       Hemoglobin A1C Ranges:    Increased Risk for Diabetes  5.7% to 6.4%  Diabetes                     >= 6.5%  Diabetic Goal                < 7.0%          Imaging Results (Last 24 Hours)     Procedure Component Value Units Date/Time    FL C Arm During Surgery [[id number]] Resulted:  09/10/20 1512     Updated:  09/10/20 1647          Assessment/Plan: Postoperative day #1 from ureteroscopic stone extraction and stent insertion.  She also has pyelonephritis urine cultures are pending.  Temperature is trending downward and white blood count is decreasing.  Home whenever culture directed antibiotics can be determined.  I will leave her stent in for 1 week and take out in the office next week        Patient Active Problem List   Diagnosis   • Acute pyelonephritis   • Urinary tract obstruction by kidney stone   • Hydronephrosis   • DIANDRA (acute kidney injury) (CMS/[facility])   • Pyelonephritis        Diagnosis Plan   1. Acute UTI     2. Renal calculi  STONE ANALYSIS - Calculus, Ureter, Left    STONE ANALYSIS - Calculus, Ureter, Left    Urine Culture - Urine, Urine, Catheter    Urine Culture - Urine, Urine, Catheter   3. Kidney stone     4. DIANDRA (acute kidney injury) (CMS/[facility])           Rashad Amin MD - 9/11/2020, 07:12

## 2020-09-11 NOTE — PROGRESS NOTES
Continued Stay Note  [facility]     Patient Name: Elza Rico  MRN: [id number]  Today's Date: 9/11/2020    Admit Date: 9/10/2020    Discharge Plan     Row Name 09/11/20 1414       Plan    Plan  discharge plan    Patient/Family in Agreement with Plan  yes    Plan Comments  Pt sleeping when CM visited room. Spoke with pt's mother and plan is home at discharge. Mother denies discharge needs. CM will cont to follow,.    Final Discharge Disposition Code  01 - home or self-care        Discharge Codes    No documentation.       Expected Discharge Date and Time     Expected Discharge Date Expected Discharge Time    Sep 13, 2020             Lita Suero RN

## 2020-09-12 VITALS
SYSTOLIC BLOOD PRESSURE: 104 MMHG | HEART RATE: 106 BPM | DIASTOLIC BLOOD PRESSURE: 61 MMHG | BODY MASS INDEX: 23.56 KG/M2 | TEMPERATURE: 99 F | RESPIRATION RATE: 16 BRPM | WEIGHT: 120 LBS | OXYGEN SATURATION: 99 % | HEIGHT: 60 IN

## 2020-09-12 LAB — BACTERIA SPEC AEROBE CULT: ABNORMAL

## 2020-09-12 PROCEDURE — 99239 HOSP IP/OBS DSCHRG MGMT >30: CPT | Performed by: INTERNAL MEDICINE

## 2020-09-12 RX ORDER — LEVOFLOXACIN 500 MG/1
500 TABLET, FILM COATED ORAL DAILY
Qty: 4 TABLET | Refills: 0 | Status: SHIPPED | OUTPATIENT
Start: 2020-09-12 | End: 2020-09-16

## 2020-09-12 RX ORDER — CEFUROXIME AXETIL 250 MG/1
250 TABLET ORAL EVERY 12 HOURS SCHEDULED
Status: DISCONTINUED | OUTPATIENT
Start: 2020-09-12 | End: 2020-09-12 | Stop reason: HOSPADM

## 2020-09-12 RX ADMIN — CEFUROXIME AXETIL 250 MG: 250 TABLET ORAL at 10:08

## 2020-09-12 RX ADMIN — SERTRALINE HYDROCHLORIDE 25 MG: 25 TABLET ORAL at 10:08

## 2020-09-12 RX ADMIN — ACETAMINOPHEN 650 MG: 325 TABLET, FILM COATED ORAL at 00:00

## 2020-09-12 NOTE — PROGRESS NOTES
"Daily Progress Note    [facility]    Subjective: Awake and alert.  Complains of a headache and shortness of breath since yesterday.  Has not really been up out of bed except to go to the restroom.  Denies abdominal pain.  Voiding well    Objective:     /61   Pulse 106   Temp 99 °F (37.2 °C) (Oral)   Resp 16   Ht 152.4 cm (60\")   Wt 54.4 kg (120 lb)   LMP 08/23/2020 (Exact Date)   SpO2 99%   BMI 23.44 kg/m²       Intake/Output Summary (Last 24 hours) at 9/12/2020 1100  Last data filed at 9/12/2020 0753  Gross per 24 hour   Intake --   Output 2550 ml   Net -2550 ml       Review of Systems:    Physical Exam:   General Appearance: alert, appears stated age and cooperative  Head: normocephalic, without obvious abnormality and atraumatic    Extremities moves extremities well, no edema, no cyanosis and no redness      Lab Results (last 24 hours)     Procedure Component Value Units Date/Time    Urine Culture - Urine, Urine, Catheter [[id number]]  (Abnormal)  (Susceptibility) Collected: 09/10/20 1541    Specimen: Urine, Catheter Updated: 09/12/20 0813     Urine Culture 25,000 CFU/mL Escherichia coli    Susceptibility      Escherichia coli     JAYESH     Ampicillin Susceptible     Ampicillin + Sulbactam Susceptible     Cefazolin Susceptible     Cefepime Susceptible     Ceftazidime Susceptible     Ceftriaxone Susceptible     Gentamicin Susceptible     Levofloxacin Susceptible     Nitrofurantoin Susceptible     Piperacillin + Tazobactam Susceptible     Tetracycline Susceptible     Trimethoprim + Sulfamethoxazole Susceptible                          Assessment/Plan: Postoperative day #2 from cystoscopy left ureteroscopic stone extraction and stent insertion.  Encouraged to get up and ambulate out in the halls.  Home whenever okay with medicine service. follow-up in my office 1 week for stent removal    Patient Active Problem List   Diagnosis   • Acute pyelonephritis   • Urinary tract " obstruction by kidney stone   • Hydronephrosis   • DIANDRA (acute kidney injury) (CMS/[facility])   • Pyelonephritis        Diagnosis Plan   1. Acute UTI     2. Renal calculi  STONE ANALYSIS - Calculus, Ureter, Left    STONE ANALYSIS - Calculus, Ureter, Left    Urine Culture - Urine, Urine, Catheter    Urine Culture - Urine, Urine, Catheter   3. Kidney stone     4. DIANDRA (acute kidney injury) (CMS/[facility])           Rashad Amin MD - 9/12/2020, 11:00 EDT

## 2020-09-12 NOTE — DISCHARGE SUMMARY
[facility] Medicine Services  DISCHARGE SUMMARY    Patient Name: Elza Rico  : 2000  MRN: [id number]    Date of Admission: 9/10/2020  1:33 AM  Date of Discharge:  2020  Primary Care Physician: Shonda Chinchilla MD    Consults     Date and Time Order Name Status Description    9/10/2020 0644 Inpatient Urology Consult            Hospital Course     Presenting Problem:   Pyelonephritis [N12]  Pyelonephritis [N12]    Active Hospital Problems    Diagnosis  POA   • **Urinary tract obstruction by kidney stone [N20.0, N13.8]  Unknown   • Acute pyelonephritis [N10]  Unknown   • Hydronephrosis [N13.30]  Unknown   • DIANDRA (acute kidney injury) (CMS/HCC) [N17.9]  Unknown   • Pyelonephritis [N12]  Yes      Resolved Hospital Problems   No resolved problems to display.          Hospital Course:  Elza Rico is a 19 y.o. female  with no significant medical history admitted for sepsis secondary to obstructing left ureteral stone.  She underwent stone retrieval and placement of ureteral stent on 9/10 with Dr. Amin.     Obstructing left distal ureteral stone with hydronephrosis  - 2-3 mm stone, s/p removal and placement of ureteral stent  - Urine culture with E. coli, JAYESH is pansensitive  - blood cultures x 2  - pain control  - DC on levaquin for 4 more days  - follow up with Dr Amin in office next week      DIANDRA  - secondary to above  - Back to baseline    Discharge Follow Up Recommendations for outpatient labs/diagnostics:  PCP in 1 week  Dr Amin in 1 week    Day of Discharge     HPI:   Feeling better, no urinary complaints. Discussed need to leave stent in until followup with Dr Amin    Review of Systems   Gen- No fevers, chills  CV- No chest pain, palpitations  Resp- No cough, dyspnea  GI- No N/V/D, abd pain      Vital Signs:   Temp:  [98.6 °F (37 °C)-99 °F (37.2 °C)] 99 °F (37.2 °C)  Heart Rate:  [] 106  Resp:  [16-18] 16  BP: ()/(56-76) 104/61     Physical  Exam:  Constitutional: No acute distress, awake, alert  HENT: NCAT, mucous membranes moist  Respiratory: Clear to auscultation bilaterally, respiratory effort normal   Cardiovascular: RRR, no murmurs  Gastrointestinal: Positive bowel sounds, soft, nontender, thin  Musculoskeletal: No edema  Psychiatric: Appropriate affect, cooperative  Neurologic: Oriented x 3, speech clear  Skin: No rashes    Pertinent  and/or Most Recent Results     Results from last 7 days   Lab Units 09/11/20  0553 09/10/20  0100   WBC 10*3/mm3 11.61* 14.13*   HEMOGLOBIN g/dL 10.4* 12.0   HEMATOCRIT % 33.3* 37.5   PLATELETS 10*3/mm3 81* 151   SODIUM mmol/L 135* 135*   POTASSIUM mmol/L 4.7 4.7   CHLORIDE mmol/L 105 99   CO2 mmol/L 20.0* 24.0   BUN mg/dL 21* 20   CREATININE mg/dL 1.14* 1.44*   GLUCOSE mg/dL 121* 133*   CALCIUM mg/dL 8.2* 9.4     Results from last 7 days   Lab Units 09/10/20  0100   BILIRUBIN mg/dL 0.4   ALK PHOS U/L 71   ALT (SGPT) U/L 9   AST (SGOT) U/L 18           Invalid input(s): TG, LDLCALC, LDLREALC  Results from last 7 days   Lab Units 09/10/20  0603 09/10/20  0100   HEMOGLOBIN A1C %  --  5.70*   PROCALCITONIN ng/mL  --  40.31*   LACTATE mmol/L 1.0  --        Brief Urine Lab Results  (Last result in the past 365 days)      Color   Clarity   Blood   Leuk Est   Nitrite   Protein   CREAT   Urine HCG        09/10/20 0110               Negative           Microbiology Results Abnormal     Procedure Component Value - Date/Time    Urine Culture - Urine, Urine, Catheter [[id number]]  (Abnormal)  (Susceptibility) Collected: 09/10/20 1541    Lab Status: Final result Specimen: Urine, Catheter Updated: 09/12/20 0813     Urine Culture 25,000 CFU/mL Escherichia coli    Susceptibility      Escherichia coli     JAYESH     Ampicillin Susceptible     Ampicillin + Sulbactam Susceptible     Cefazolin Susceptible     Cefepime Susceptible     Ceftazidime Susceptible     Ceftriaxone Susceptible     Gentamicin Susceptible     Levofloxacin Susceptible       Nitrofurantoin Susceptible     Piperacillin + Tazobactam Susceptible     Tetracycline Susceptible     Trimethoprim + Sulfamethoxazole Susceptible                    COVID-19, ABBOTT IN-HOUSE,NP Swab (NO TRANSPORT MEDIA) 2 HR TAT - Swab, Nasopharynx [[id number]]  (Normal) Collected: 09/10/20 0509    Lab Status: Final result Specimen: Swab from Nasopharynx Updated: 09/10/20 0605     COVID19 Not Detected    Narrative:      Fact sheet for providers: https://www.fda.gov/media/839607/download     Fact sheet for patients: https://www.fda.gov/media/582478/download          Imaging Results (All)     Procedure Component Value Units Date/Time    FL C Arm During Surgery [[id number]] Collected: 09/11/20 0742     Updated: 09/11/20 0815    Narrative:      EXAMINATION: FLUOROSCOPY C-ARM DURING SURGERY-     INDICATION: Cystoscopy; N20.0-Calculus of kidney.      TECHNIQUE: Intraoperative fluoroscopy for improved localization and  treatment planning.     COMPARISON: None.     FINDINGS: Intraoperative fluoroscopy with total fluoroscopic time usage  18 seconds and two images saved during cystoscopy with left ureteral  stent placement.       Impression:      Intraoperative fluoroscopy utilized during cystoscopy with  left ureteral stent placement.     D:  09/11/2020  E:  09/11/2020           CT Abdomen Pelvis Without Contrast [[id number]] Collected: 09/10/20 0327     Updated: 09/10/20 0329    Narrative:      CT Abdomen Pelvis WO    INDICATION:   Left flank pain for 2 weeks. Urinary tract infection.    TECHNIQUE:   CT of the abdomen and pelvis without IV contrast. Coronal and sagittal reconstructions were obtained.  Radiation dose reduction techniques included automated exposure control or exposure modulation based on body size. Count of known CT and cardiac nuc  med studies performed in previous 12 months: 0.     COMPARISON:   None available.    FINDINGS:  Lung bases are clear. Gallbladder is within normal limits. Multiple small  nonobstructing stones are noted in the right kidney. The largest of these measures only 2 to 3 mm. There is left-sided hydronephrosis. There is a 2 to 3 mm calcification in the  left hemipelvis diffuse in meters above the bladder which may be within the distal left ureter. Solid abdominal organs are otherwise normal. Urinary bladder is normal. Solid pelvic organs are grossly normal. Free fluid in the cul-de-sac may be  physiologic. The GI tract is difficult to characterize without contrast. No bowel obstruction is seen. There is nothing to suggest acute colitis. The appendix is normal.      Impression:        1. There is mild left hydronephrosis. A 2 to 3 mm calcification in the left hemipelvis a few cm above the UVJ is probably a distal left ureteral stone.  2. Small nonobstructing stones in the right kidney.  3. Grossly normal GI tract, including the appendix.  4. Free fluid in the cul-de-sac, likely physiologic finding.          Signer Name: Jayy Goldman MD   Signed: 9/10/2020 3:27 AM   Workstation Name: [id number]    [facility]                         Plan for Follow-up of Pending Labs/Results:   Pending Labs     Order Current Status    STONE ANALYSIS - Calculus, Ureter, Left In process        Discharge Details        Discharge Medications      New Medications      Instructions Start Date   levoFLOXacin 500 MG tablet  Commonly known as: Levaquin   500 mg, Oral, Daily         Continue These Medications      Instructions Start Date   levocetirizine 5 MG tablet  Commonly known as: XYZAL   5 mg, Oral, Every Evening      traZODone 50 MG tablet  Commonly known as: DESYREL   50 mg, Oral, Nightly      Zoloft 25 MG tablet  Generic drug: sertraline   25 mg, Oral, Daily         Stop These Medications    HYDROcodone-acetaminophen 5-325 MG per tablet  Commonly known as: NORCO     methocarbamol 500 MG tablet  Commonly known as: ROBAXIN     naproxen 500 MG EC tablet  Commonly known as: EC NAPROSYN             No Known Allergies      Discharge Disposition:  Home or Self Care    Diet:  Hospital:  Diet Order   Procedures   • Diet Regular       Activity:  as tolerated    Restrictions or Other Recommendations:  Followup with Dr Amin next week for removal of stent       CODE STATUS:    Code Status and Medical Interventions:   Ordered at: 09/10/20 0449     Code Status:    CPR     Medical Interventions (Level of Support Prior to Arrest):    Full       Future Appointments   Date Time Provider Department Center   10/15/2020  3:00 PM Rossy Brizuela PA-C [facility]           Electronically signed by Jaclyn Arreaga DO, 09/12/20, 10:33 AM EDT.      Time Spent on Discharge:  I spent 31  minutes on this discharge activity which included: face-to-face encounter with the patient, reviewing the data in the system, coordination of the care with the nursing staff as well as consultants, documentation, and entering orders.

## 2020-09-22 LAB
COLOR STONE: NORMAL
COM MFR STONE: 100 %
COMPN STONE: NORMAL
LABORATORY COMMENT REPORT: NORMAL
Lab: NORMAL
Lab: NORMAL
PHOTO: NORMAL
SIZE STONE: NORMAL MM
SPEC SOURCE SUBJ: NORMAL
STONE ANALYSIS-IMP: NORMAL
WT STONE: 3 MG

## 2021-08-17 ENCOUNTER — APPOINTMENT (OUTPATIENT)
Dept: ULTRASOUND IMAGING | Facility: HOSPITAL | Age: 21
End: 2021-08-17

## 2021-08-17 ENCOUNTER — HOSPITAL ENCOUNTER (EMERGENCY)
Facility: HOSPITAL | Age: 21
Discharge: HOME | End: 2021-08-17
Attending: EMERGENCY MEDICINE | Admitting: EMERGENCY MEDICINE

## 2021-08-17 VITALS
HEIGHT: 60 IN | DIASTOLIC BLOOD PRESSURE: 78 MMHG | RESPIRATION RATE: 16 BRPM | HEART RATE: 86 BPM | WEIGHT: 120 LBS | SYSTOLIC BLOOD PRESSURE: 116 MMHG | OXYGEN SATURATION: 100 % | BODY MASS INDEX: 23.56 KG/M2 | TEMPERATURE: 98 F

## 2021-08-17 DIAGNOSIS — N94.6 DYSMENORRHEA: ICD-10-CM

## 2021-08-17 DIAGNOSIS — N92.1 MENORRHAGIA WITH IRREGULAR CYCLE: Primary | ICD-10-CM

## 2021-08-17 LAB
B-HCG UR QL: NEGATIVE
BASOPHILS # BLD AUTO: 0.03 10*3/MM3 (ref 0–0.2)
BASOPHILS NFR BLD AUTO: 0.4 % (ref 0–1.5)
CLUE CELLS SPEC QL WET PREP: ABNORMAL
DEPRECATED RDW RBC AUTO: 41.5 FL (ref 37–54)
EOSINOPHIL # BLD AUTO: 0.04 10*3/MM3 (ref 0–0.4)
EOSINOPHIL NFR BLD AUTO: 0.5 % (ref 0.3–6.2)
ERYTHROCYTE [DISTWIDTH] IN BLOOD BY AUTOMATED COUNT: 14.1 % (ref 12.3–15.4)
HCT VFR BLD AUTO: 39.6 % (ref 34–46.6)
HGB BLD-MCNC: 12.2 G/DL (ref 12–15.9)
HOLD SPECIMEN: NORMAL
HYDATID CYST SPEC WET PREP: ABNORMAL
IMM GRANULOCYTES # BLD AUTO: 0.02 10*3/MM3 (ref 0–0.05)
IMM GRANULOCYTES NFR BLD AUTO: 0.3 % (ref 0–0.5)
INTERNAL NEGATIVE CONTROL: NORMAL
INTERNAL POSITIVE CONTROL: NORMAL
KOH PREP NAIL: NORMAL
LYMPHOCYTES # BLD AUTO: 2.34 10*3/MM3 (ref 0.7–3.1)
LYMPHOCYTES NFR BLD AUTO: 31.7 % (ref 19.6–45.3)
Lab: NORMAL
MCH RBC QN AUTO: 25.3 PG (ref 26.6–33)
MCHC RBC AUTO-ENTMCNC: 30.8 G/DL (ref 31.5–35.7)
MCV RBC AUTO: 82.2 FL (ref 79–97)
MONOCYTES # BLD AUTO: 0.57 10*3/MM3 (ref 0.1–0.9)
MONOCYTES NFR BLD AUTO: 7.7 % (ref 5–12)
NEUTROPHILS NFR BLD AUTO: 4.39 10*3/MM3 (ref 1.7–7)
NEUTROPHILS NFR BLD AUTO: 59.4 % (ref 42.7–76)
NRBC BLD AUTO-RTO: 0 /100 WBC (ref 0–0.2)
PLATELET # BLD AUTO: 189 10*3/MM3 (ref 140–450)
PMV BLD AUTO: 10.8 FL (ref 6–12)
RBC # BLD AUTO: 4.82 10*6/MM3 (ref 3.77–5.28)
T VAGINALIS SPEC QL WET PREP: ABNORMAL
WBC # BLD AUTO: 7.39 10*3/MM3 (ref 3.4–10.8)
WBC SPEC QL WET PREP: ABNORMAL
WHOLE BLOOD HOLD SPECIMEN: NORMAL
YEAST GENITAL QL WET PREP: ABNORMAL

## 2021-08-17 PROCEDURE — 99283 EMERGENCY DEPT VISIT LOW MDM: CPT

## 2021-08-17 PROCEDURE — 87591 N.GONORRHOEAE DNA AMP PROB: CPT | Performed by: PHYSICIAN ASSISTANT

## 2021-08-17 PROCEDURE — 87220 TISSUE EXAM FOR FUNGI: CPT | Performed by: PHYSICIAN ASSISTANT

## 2021-08-17 PROCEDURE — 85025 COMPLETE CBC W/AUTO DIFF WBC: CPT

## 2021-08-17 PROCEDURE — 87491 CHLMYD TRACH DNA AMP PROBE: CPT | Performed by: PHYSICIAN ASSISTANT

## 2021-08-17 PROCEDURE — 87210 SMEAR WET MOUNT SALINE/INK: CPT | Performed by: PHYSICIAN ASSISTANT

## 2021-08-17 PROCEDURE — 81025 URINE PREGNANCY TEST: CPT | Performed by: EMERGENCY MEDICINE

## 2021-08-17 PROCEDURE — 76830 TRANSVAGINAL US NON-OB: CPT

## 2021-08-17 RX ORDER — IBUPROFEN 200 MG
200 TABLET ORAL EVERY 6 HOURS PRN
COMMUNITY
End: 2021-08-17

## 2021-08-17 RX ORDER — SODIUM CHLORIDE 0.9 % (FLUSH) 0.9 %
10 SYRINGE (ML) INJECTION AS NEEDED
Status: DISCONTINUED | OUTPATIENT
Start: 2021-08-17 | End: 2021-08-17 | Stop reason: HOSPADM

## 2021-08-17 RX ORDER — ACETAMINOPHEN 500 MG
500 TABLET ORAL EVERY 6 HOURS PRN
COMMUNITY

## 2021-08-17 NOTE — ED PROVIDER NOTES
Subjective   20 old female comes emerged today with increasing vaginal bleeding and severe abdominal cramping.  She reports that she is finished her menstrual period about 2 weeks ago she began having bleeding again but this is more the bleeding than her normal period.  She states that when she got there about 4 pads a day she been going through about 10 a day for the past 2 days.  She been passing fairly large clots one as large as the palm of her hand.  She reports she had no vaginal discharge other than the bleeding.  She denies dysuria frequency urgency or hematuria.  She had no previous pelvic ultrasounds, she is sexually active.  She reports that she does not believe her self to be pregnant.  She is not on any type of birth control.  She does not have an OB/GYN.  She has no other complaints.      History provided by:  Patient and significant other   used: No    Dysmenorrhea  Location:  Lower abdominal cramping heavy vaginal bleeding  Quality:  Severe  Severity:  Severe  Onset quality:  Gradual  Duration:  2 days  Timing:  Intermittent  Progression:  Waxing and waning  Chronicity:  New  Context:  Abnormal menstrual bleeding just finished her last menstrual period 2 weeks ago.  Relieved by:  Nothing  Worsened by:  Nothing  Associated symptoms: abdominal pain and nausea    Associated symptoms: no congestion, no cough, no ear pain, no fever, no headaches, no rash, no rhinorrhea, no sore throat and no wheezing        Review of Systems   Constitutional: Negative for chills and fever.   HENT: Negative for congestion, ear pain, rhinorrhea and sore throat.    Respiratory: Negative for cough, chest tightness and wheezing.    Gastrointestinal: Positive for abdominal pain and nausea.   Musculoskeletal: Negative for back pain and neck pain.   Skin: Negative for pallor and rash.   Neurological: Negative for headaches.   All other systems reviewed and are negative.      Past Medical History:   Diagnosis  Date   • Carpal tunnel syndrome    • BENITO (juvenile idiopathic arthritis) (CMS/HCC)    • Kidney stone    • Mood disorder (CMS/HCC)        No Known Allergies    Past Surgical History:   Procedure Laterality Date   • CYSTOSCOPY W/ URETERAL STENT PLACEMENT Left 9/10/2020    Procedure: CYSTOSCOPY, LEFT URETEROSCOPY, STONE BASKETING AND LEFT URETERAL STENT PLACEMENT;  Surgeon: Rashad Amin MD;  Location: [facility];  Service: Urology;  Laterality: Left;       Family History   Problem Relation Age of Onset   • Nephrolithiasis Father    • Hypertension Father        Social History     Socioeconomic History   • Marital status: Single     Spouse name: Not on file   • Number of children: Not on file   • Years of education: Not on file   • Highest education level: Not on file   Tobacco Use   • Smoking status: Never Smoker   • Smokeless tobacco: Never Used   Vaping Use   • Vaping Use: Never used   Substance and Sexual Activity   • Alcohol use: No   • Drug use: No   • Sexual activity: Defer           Objective   Physical Exam  Vitals and nursing note reviewed.   Constitutional:       Appearance: She is well-developed.   HENT:      Head: Normocephalic and atraumatic.      Right Ear: External ear normal.      Left Ear: External ear normal.      Nose: Nose normal.   Eyes:      General: No scleral icterus.     Conjunctiva/sclera: Conjunctivae normal.      Pupils: Pupils are equal, round, and reactive to light.   Neck:      Thyroid: No thyromegaly.   Cardiovascular:      Rate and Rhythm: Normal rate and regular rhythm.      Heart sounds: Normal heart sounds.   Pulmonary:      Effort: Pulmonary effort is normal. No respiratory distress.      Breath sounds: Normal breath sounds. No wheezing or rales.   Chest:      Chest wall: No tenderness.   Abdominal:      General: Bowel sounds are normal. There is no distension.      Palpations: Abdomen is soft.      Tenderness: There is no abdominal tenderness.   Genitourinary:     Comments: BUS  is clear.  There is minimal amount of dark red blood in the vaginal vault.  Cervical motion tenderness was not present.  No adnexal mass was palpable.  Musculoskeletal:         General: Normal range of motion.      Cervical back: Normal range of motion.   Lymphadenopathy:      Cervical: No cervical adenopathy.   Skin:     General: Skin is warm and dry.   Neurological:      Mental Status: She is alert and oriented to person, place, and time.      Cranial Nerves: No cranial nerve deficit.      Coordination: Coordination normal.      Deep Tendon Reflexes: Reflexes are normal and symmetric. Reflexes normal.   Psychiatric:         Behavior: Behavior normal.         Thought Content: Thought content normal.         Judgment: Judgment normal.         Procedures           ED Course                                 Recent Results (from the past 24 hour(s))   Green Top (Gel)    Collection Time: 08/17/21  9:08 AM   Result Value Ref Range    Extra Tube Hold for add-ons.    Lavender Top    Collection Time: 08/17/21  9:08 AM   Result Value Ref Range    Extra Tube hold for add-on    Gold Top - SST    Collection Time: 08/17/21  9:08 AM   Result Value Ref Range    Extra Tube Hold for add-ons.    Gray Top    Collection Time: 08/17/21  9:08 AM   Result Value Ref Range    Extra Tube Hold for add-ons.    CBC Auto Differential    Collection Time: 08/17/21  9:08 AM    Specimen: Blood   Result Value Ref Range    WBC 7.39 3.40 - 10.80 10*3/mm3    RBC 4.82 3.77 - 5.28 10*6/mm3    Hemoglobin 12.2 12.0 - 15.9 g/dL    Hematocrit 39.6 34.0 - 46.6 %    MCV 82.2 79.0 - 97.0 fL    MCH 25.3 (L) 26.6 - 33.0 pg    MCHC 30.8 (L) 31.5 - 35.7 g/dL    RDW 14.1 12.3 - 15.4 %    RDW-SD 41.5 37.0 - 54.0 fl    MPV 10.8 6.0 - 12.0 fL    Platelets 189 140 - 450 10*3/mm3    Neutrophil % 59.4 42.7 - 76.0 %    Lymphocyte % 31.7 19.6 - 45.3 %    Monocyte % 7.7 5.0 - 12.0 %    Eosinophil % 0.5 0.3 - 6.2 %    Basophil % 0.4 0.0 - 1.5 %    Immature Grans % 0.3 0.0 - 0.5  %    Neutrophils, Absolute 4.39 1.70 - 7.00 10*3/mm3    Lymphocytes, Absolute 2.34 0.70 - 3.10 10*3/mm3    Monocytes, Absolute 0.57 0.10 - 0.90 10*3/mm3    Eosinophils, Absolute 0.04 0.00 - 0.40 10*3/mm3    Basophils, Absolute 0.03 0.00 - 0.20 10*3/mm3    Immature Grans, Absolute 0.02 0.00 - 0.05 10*3/mm3    nRBC 0.0 0.0 - 0.2 /100 WBC   POCT Urine Pregnancy    Collection Time: 08/17/21 10:06 AM    Specimen: Urine   Result Value Ref Range    HCG, Urine, QL Negative Negative    Lot Number [id number]     Internal Positive Control Passed Positive, Passed    Internal Negative Control Passed Negative, Passed   KOH Prep - Swab, Vagina    Collection Time: 08/17/21 10:55 AM    Specimen: Vagina; Swab   Result Value Ref Range    KOH Prep No yeast or hyphal elements seen No yeast or hyphal elements seen   Wet Prep, Genital - Swab, Vagina    Collection Time: 08/17/21 10:55 AM    Specimen: Vagina; Swab   Result Value Ref Range    YEAST No yeast seen No yeast seen    HYPHAL ELEMENTS No Hyphal elements seen No Hyphal elements seen    WBC'S 2+ WBC's seen (A) No WBC's seen    Clue Cells, Wet Prep No Clue cells seen No Clue cells seen    Trichomonas, Wet Prep No Trichomonas seen No Trichomonas seen     Note: In addition to lab results from this visit, the labs listed above may include labs taken at another facility or during a different encounter within the last 24 hours. Please correlate lab times with ED admission and discharge times for further clarification of the services performed during this visit.    US Non-ob Transvaginal   Preliminary Result   1. 2.4 cm simple appearing left ovarian cyst.       2. Right ovary and uterus appear within normal limits. No other   significant intrapelvic pathology is seen.       D:  08/17/2021   E:  08/17/2021                Vitals:    08/17/21 1015 08/17/21 1030 08/17/21 1100 08/17/21 1115   BP:  105/68 116/78    BP Location:       Patient Position:       Pulse:       Resp:       Temp:        TempSrc:       SpO2: 97%  100% 100%   Weight:       Height:         Medications   sodium chloride 0.9 % flush 10 mL (has no administration in time range)     ECG/EMG Results (last 24 hours)     ** No results found for the last 24 hours. **        No orders to display               MDM  Number of Diagnoses or Management Options  Dysmenorrhea: new and requires workup  Menorrhagia with irregular cycle: new and requires workup     Amount and/or Complexity of Data Reviewed  Clinical lab tests: reviewed and ordered  Tests in the radiology section of CPT®: reviewed and ordered  Tests in the medicine section of CPT®: ordered and reviewed  Discuss the patient with other providers: yes    Patient Progress  Patient progress: stable      Final diagnoses:   Menorrhagia with irregular cycle   Dysmenorrhea       ED Disposition  ED Disposition     ED Disposition Condition Comment    Discharge Stable           Jr Whitehead MD  [address] [address]  [phone]               Medication List      New Prescriptions    diclofenac 50 MG EC tablet  Commonly known as: VOLTAREN  Take 1 tablet by mouth 3 (Three) Times a Day.        Stop    ibuprofen 200 MG tablet  Commonly known as: ADVILMOTRIN           Where to Get Your Medications      These medications were sent to [facility] [address] [place] [address] [address] [place] [address] - [phone]  - [phone] [place] [address]    Phone: [phone]   · diclofenac 50 MG EC tablet          Jimmy Ceja PA  08/17/21 3510

## 2021-08-18 ENCOUNTER — NURSE TRIAGE (OUTPATIENT)
Dept: CALL CENTER | Facility: HOSPITAL | Age: 21
End: 2021-08-18

## 2021-08-18 NOTE — TELEPHONE ENCOUNTER
"    Reason for Disposition  • General information question, no triage required and triager able to answer question    Additional Information  • Negative: [1] Caller is not with the adult (patient) AND [2] reporting urgent symptoms  • Negative: Lab result questions  • Negative: Medication questions  • Negative: Caller can't be reached by phone  • Negative: Caller has already spoken to PCP or another triager  • Negative: RN needs further essential information from caller in order to complete triage  • Negative: Requesting regular office appointment  • Negative: [1] Caller requesting NON-URGENT health information AND [2] PCP's office is the best resource  • Negative: Health Information question, no triage required and triager able to answer question    Answer Assessment - Initial Assessment Questions  1. REASON FOR CALL or QUESTION: \"What is your reason for calling today?\" or \"How can I best help you?\" or \"What question do you have that I can help answer?\"      Was seen at the ER yesterday and failed to get a work excuse.  Spoke with ER staff ER [facility] will print an excuse patient to go to the ER desk to pick it up.    Protocols used: INFORMATION ONLY CALL - NO TRIAGE-ADULT-      "

## 2021-08-19 ENCOUNTER — TELEPHONE (OUTPATIENT)
Dept: EMERGENCY DEPT | Facility: HOSPITAL | Age: 21
End: 2021-08-19

## 2021-08-19 LAB
C TRACH RRNA SPEC QL NAA+PROBE: POSITIVE
N GONORRHOEA RRNA SPEC QL NAA+PROBE: NEGATIVE

## 2021-08-19 NOTE — TELEPHONE ENCOUNTER
Patient is overall feeling better.  I did advise the patient of her positive chlamydia test.  I called in doxycycline prescription to her pharmacy on [facility] at her request.  All questions answered.  She is awaiting follow-up to OB GYN as well.  Also advised her no sex for 2 weeks as well as any recent partners must also be tested as well.  Thankful for the call.

## 2021-10-06 ENCOUNTER — OFFICE VISIT (OUTPATIENT)
Dept: OBSTETRICS AND GYNECOLOGY | Facility: CLINIC | Age: 21
End: 2021-10-06

## 2021-10-06 VITALS
DIASTOLIC BLOOD PRESSURE: 66 MMHG | HEIGHT: 63 IN | BODY MASS INDEX: 21.44 KG/M2 | WEIGHT: 121 LBS | SYSTOLIC BLOOD PRESSURE: 104 MMHG

## 2021-10-06 DIAGNOSIS — Z20.2 EXPOSURE TO STD: Primary | ICD-10-CM

## 2021-10-06 DIAGNOSIS — N92.6 IRREGULAR PERIODS: ICD-10-CM

## 2021-10-06 PROCEDURE — 99203 OFFICE O/P NEW LOW 30 MIN: CPT | Performed by: OBSTETRICS & GYNECOLOGY

## 2021-10-06 RX ORDER — SERTRALINE HYDROCHLORIDE 100 MG/1
100 TABLET, FILM COATED ORAL DAILY
COMMUNITY
Start: 2021-09-19

## 2021-10-06 RX ORDER — LEVONORGESTREL AND ETHINYL ESTRADIOL 0.1-0.02MG
1 KIT ORAL DAILY
Qty: 28 TABLET | Refills: 12 | Status: SHIPPED | OUTPATIENT
Start: 2021-10-06 | End: 2022-10-04

## 2021-10-06 NOTE — PATIENT INSTRUCTIONS
Oral Contraception Information  Oral contraceptive pills (OCPs) are medicines taken to prevent pregnancy. OCPs are taken by mouth, and they work by:  · Preventing the ovaries from releasing eggs.  · Thickening mucus in the lower part of the uterus (cervix), which prevents sperm from entering the uterus.  · Thinning the lining of the uterus (endometrium), which prevents a fertilized egg from attaching to the endometrium.  OCPs are highly effective when taken exactly as prescribed. However, OCPs do not prevent STIs (sexually transmitted infections). Safe sex practices, such as using condoms while on an OCP, can help prevent STIs.  Before starting OCPs  Before you start taking OCPs, you may have a physical exam, blood test, and Pap test. However, you are not required to have a pelvic exam in order to be prescribed OCPs. Your health care provider will make sure you are a good candidate for oral contraception. OCPs are not a good option for certain women, including women who smoke and are older than 35 years, and women with a medical history of high blood pressure, deep vein thrombosis, pulmonary embolism, stroke, cardiovascular disease, or peripheral vascular disease.  Discuss with your health care provider the possible side effects of the OCP you may be prescribed. When you start an OCP, be aware that it can take 2-3 months for your body to adjust to changes in hormone levels.  Follow instructions from your health care provider about how to start taking your first cycle of OCPs. Depending on when you start the pill, you may need to use a backup form of birth control, such as condoms, during the first week. Make sure you know what steps to take if you ever forget to take the pill.  Types of oral contraception    The most common types of birth control pills contain the hormones estrogen and progestin (synthetic progesterone) or progestin only.  The combination pill  This type of pill contains estrogen and progestin  hormones. Combination pills often come in packs of 21, 28, or 91 pills. For each pack, the last 7 pills may not contain hormones, which means you may stop taking the pills for 7 days. Menstrual bleeding occurs during the week that you do not take the pills or that you take the pills with no hormones in them.  The minipill  This type of pill contains the progestin hormone only. It comes in packs of 28 pills. All 28 pills contain the hormone. You take the pill every day. It is very important to take the pill at the same time each day.  Advantages of oral contraceptive pills  · Provides reliable and continuous contraception if taken as instructed.  · May treat or decrease symptoms of:  ? Menstrual period cramps.  ? Irregular menstrual cycle or bleeding.  ? Heavy menstrual flow.  ? Abnormal uterine bleeding.  ? Acne, depending on the type of pill.  ? Polycystic ovarian syndrome.  ? Endometriosis.  ? Iron deficiency anemia.  ? Premenstrual symptoms, including premenstrual dysphoric disorder.  · May reduce the risk of endometrial and ovarian cancer.  · Can be used as emergency contraception.  · Prevents mislocated (ectopic) pregnancies and infections of the fallopian tubes.  Things that can make oral contraceptive pills less effective  OCPs can be less effective if:  · You forget to take the pill at the same time every day. This is especially important when taking the minipill.  · You have a stomach or intestinal disease that reduces your body's ability to absorb the pill.  · You take OCPs with other medicines that make OCPs less effective, such as antibiotics, certain HIV medicines, and some seizure medicines.  · You take  OCPs.  · You forget to restart the pill on day 7, if using the packs of 21 pills.  Risks associated with oral contraceptive pills  Oral contraceptive pills can sometimes cause side effects, such as:  · Headache.  · Depression.  · Trouble sleeping.  · Nausea and vomiting.  · Breast  tenderness.  · Irregular bleeding or spotting during the first several months.  · Bloating or fluid retention.  · Increase in blood pressure.  Combination pills are also associated with a small increase in the risk of:  · Blood clots.  · Heart attack.  · Stroke.  Summary  · Oral contraceptive pills are medicines taken by mouth to prevent pregnancy. They are highly effective when taken exactly as prescribed.  · The most common types of birth control pills contain the hormones estrogen and progestin (synthetic progesterone) or progestin only.  · Before you start taking the pill, you may have a physical exam, blood test, and Pap test. Your health care provider will make sure you are a good candidate for oral contraception.  · The combination pill may come in a 21-day pack, a 28-day pack, or a 91-day pack. The minipill contains the progesterone hormone only and comes in packs of 28 pills.  · Oral contraceptive pills can sometimes cause side effects, such as headache, nausea, breast tenderness, or irregular bleeding.  This information is not intended to replace advice given to you by your health care provider. Make sure you discuss any questions you have with your health care provider.  Document Revised: 11/30/2018 Document Reviewed: 03/13/2018  [employer] Patient Education © 2021 [employer]

## 2021-10-06 NOTE — PROGRESS NOTES
Chief Complaint   Patient presents with   • Establish Care   • Follow-up   • Dysmenorrhea   • Passing Blood Clots   • Previously positive for STD       Subjective   HPI  Elza Rico is a 20 y.o. female, , who presents for established care, ED follow up is initial.  The patient has previously been evaluated for abnormal bleeding at the ED on  for passing vaginal blood clots that were bigger than a golf size ball. Patient previously tested positive for chlamydia on  and was treated with antibiotics from the ED and was advised to follow up with an OBGYN for test of cure.    Her prior US and labs have been reviewed and addressed.     She states she has experienced this problem for 2 months.  She describes the severity as moderate-severe but has now resolved.  She states that the problem is improving.  The patient reports additional symptoms as none.      Her last LMP was 2021.  Periods are regular every 28-30 days, lasting 5 days.  Dysmenorrhea:mild, occurring premenstrually and first 1-2 days of flow.  Patient reports problems with: none.  Partner Status: Marital Status: single.  New Partners since last visit: no.  Desires STD Screening: yes.    Additional OB/GYN History   Current contraception: contraceptive methods: None  Desires to: discuss starting contraception  Last Pap : never  Last Completed Pap Smear     This patient has no relevant Health Maintenance data.        History of abnormal Pap smear: no  Last mammogram: never  Last Completed Mammogram     This patient has no relevant Health Maintenance data.        Tobacco Usage?: No   OB History        0    Para   0    Term   0       0    AB   0    Living   0       SAB   0    TAB   0    Ectopic   0    Molar   0    Multiple   0    Live Births   0                Health Maintenance   Topic Date Due   • ANNUAL PHYSICAL  Never done   • HPV VACCINES (1 - 2-dose series) Never done   • COVID-19 Vaccine (1) Never done   • TDAP/TD  "VACCINES (1 - Tdap) Never done   • HEPATITIS C SCREENING  Never done   • INFLUENZA VACCINE  Never done   • CHLAMYDIA SCREENING  08/17/2022   • Pneumococcal Vaccine 0-64  Aged Out   • MENINGOCOCCAL VACCINE  Aged Out       The additional following portions of the patient's history were reviewed and updated as appropriate: allergies, current medications, past family history, past medical history, past social history, past surgical history and problem list.    Review of Systems   Constitutional: Negative for activity change, appetite change, unexpected weight gain and unexpected weight loss.   Eyes: Negative for visual disturbance.   Respiratory: Negative for cough and shortness of breath.    Cardiovascular: Negative for chest pain and palpitations.   Gastrointestinal: Negative for abdominal distention, abdominal pain, nausea and rectal pain.   Genitourinary: Positive for menstrual problem. Negative for breast discharge, breast lump, breast pain and pelvic pain.   Musculoskeletal: Negative for back pain.   Neurological: Negative for light-headedness and headache.   Psychiatric/Behavioral: Negative for agitation, behavioral problems, decreased concentration and depressed mood.       I have reviewed and agree with the HPI, ROS, and historical information as entered above. Jr Whitehead MD    Objective   /66   Ht 160 cm (63\")   Wt 54.9 kg (121 lb)   LMP 09/23/2021   Breastfeeding No   BMI 21.43 kg/m²     Physical Exam  Vitals reviewed. Exam conducted with a chaperone present.   Constitutional:       Appearance: Normal appearance.   HENT:      Head: Normocephalic and atraumatic.   Abdominal:      General: Abdomen is flat. Bowel sounds are normal.      Palpations: Abdomen is soft.   Genitourinary:     General: Normal vulva.      Vagina: Normal.      Cervix: Normal.      Uterus: Normal.       Adnexa: Right adnexa normal and left adnexa normal.   Skin:     General: Skin is warm and dry.   Neurological:      " Mental Status: She is alert and oriented to person, place, and time.   Psychiatric:         Mood and Affect: Mood normal.         Behavior: Behavior normal.         Assessment/Plan     Assessment     Problem List Items Addressed This Visit        Genitourinary and Reproductive     Irregular periods      Other Visit Diagnoses     Exposure to STD    -  Primary    Relevant Orders    Chlamydia trachomatis, Neisseria gonorrhoeae, Trichomonas vaginalis, PCR - Swab, Cervix          Plan     Return in about 1 year (around 10/6/2022) for Annual physical.  1. Will start on OCPs for irregular periods and will f/u as needed.   2. RICHIE for chlamydia performed today and I recommend safe sexual behaviors.       Jr Whitehead MD  10/06/2021

## 2021-10-08 LAB
C TRACH RRNA SPEC QL NAA+PROBE: NEGATIVE
N GONORRHOEA RRNA SPEC QL NAA+PROBE: NEGATIVE
T VAGINALIS DNA SPEC QL NAA+PROBE: NEGATIVE

## 2022-05-06 ENCOUNTER — LAB (OUTPATIENT)
Dept: LAB | Facility: HOSPITAL | Age: 22
End: 2022-05-06

## 2022-05-06 ENCOUNTER — OFFICE VISIT (OUTPATIENT)
Dept: FAMILY MEDICINE CLINIC | Facility: CLINIC | Age: 22
End: 2022-05-06

## 2022-05-06 VITALS
HEIGHT: 63 IN | DIASTOLIC BLOOD PRESSURE: 70 MMHG | OXYGEN SATURATION: 98 % | HEART RATE: 86 BPM | WEIGHT: 123 LBS | SYSTOLIC BLOOD PRESSURE: 114 MMHG | BODY MASS INDEX: 21.79 KG/M2

## 2022-05-06 DIAGNOSIS — R73.03 PREDIABETES: ICD-10-CM

## 2022-05-06 DIAGNOSIS — F41.1 GENERALIZED ANXIETY DISORDER: ICD-10-CM

## 2022-05-06 DIAGNOSIS — Z00.00 PREVENTATIVE HEALTH CARE: Primary | ICD-10-CM

## 2022-05-06 DIAGNOSIS — Z00.00 PREVENTATIVE HEALTH CARE: ICD-10-CM

## 2022-05-06 LAB
25(OH)D3 SERPL-MCNC: 36 NG/ML (ref 30–100)
ALBUMIN SERPL-MCNC: 4.5 G/DL (ref 3.5–5.2)
ALBUMIN/GLOB SERPL: 1.2 G/DL
ALP SERPL-CCNC: 75 U/L (ref 39–117)
ALT SERPL W P-5'-P-CCNC: 10 U/L (ref 1–33)
ANION GAP SERPL CALCULATED.3IONS-SCNC: 11.4 MMOL/L (ref 5–15)
ANISOCYTOSIS BLD QL: ABNORMAL
AST SERPL-CCNC: 14 U/L (ref 1–32)
BACTERIA UR QL AUTO: ABNORMAL /HPF
BILIRUB SERPL-MCNC: 0.2 MG/DL (ref 0–1.2)
BILIRUB UR QL STRIP: NEGATIVE
BUN SERPL-MCNC: 11 MG/DL (ref 6–20)
BUN/CREAT SERPL: 12 (ref 7–25)
CALCIUM SPEC-SCNC: 9.8 MG/DL (ref 8.6–10.5)
CHLORIDE SERPL-SCNC: 101 MMOL/L (ref 98–107)
CHOLEST SERPL-MCNC: 135 MG/DL (ref 0–200)
CLARITY UR: CLEAR
CO2 SERPL-SCNC: 23.6 MMOL/L (ref 22–29)
COLOR UR: YELLOW
CREAT SERPL-MCNC: 0.92 MG/DL (ref 0.57–1)
DEPRECATED RDW RBC AUTO: 43.2 FL (ref 37–54)
EGFRCR SERPLBLD CKD-EPI 2021: 91 ML/MIN/1.73
EOSINOPHIL # BLD MANUAL: 0.23 10*3/MM3 (ref 0–0.4)
EOSINOPHIL NFR BLD MANUAL: 4 % (ref 0.3–6.2)
ERYTHROCYTE [DISTWIDTH] IN BLOOD BY AUTOMATED COUNT: 16.6 % (ref 12.3–15.4)
GLOBULIN UR ELPH-MCNC: 3.8 GM/DL
GLUCOSE SERPL-MCNC: 77 MG/DL (ref 65–99)
GLUCOSE UR STRIP-MCNC: NEGATIVE MG/DL
HBA1C MFR BLD: 5.3 % (ref 4.8–5.6)
HCT VFR BLD AUTO: 36.7 % (ref 34–46.6)
HDLC SERPL-MCNC: 36 MG/DL (ref 40–60)
HGB BLD-MCNC: 11 G/DL (ref 12–15.9)
HGB UR QL STRIP.AUTO: NEGATIVE
HYALINE CASTS UR QL AUTO: ABNORMAL /LPF
KETONES UR QL STRIP: NEGATIVE
LDLC SERPL CALC-MCNC: 82 MG/DL (ref 0–100)
LDLC/HDLC SERPL: 2.26 {RATIO}
LEUKOCYTE ESTERASE UR QL STRIP.AUTO: ABNORMAL
LYMPHOCYTES # BLD MANUAL: 2.87 10*3/MM3 (ref 0.7–3.1)
LYMPHOCYTES NFR BLD MANUAL: 7 % (ref 5–12)
MCH RBC QN AUTO: 22 PG (ref 26.6–33)
MCHC RBC AUTO-ENTMCNC: 30 G/DL (ref 31.5–35.7)
MCV RBC AUTO: 73.4 FL (ref 79–97)
MICROCYTES BLD QL: ABNORMAL
MONOCYTES # BLD: 0.41 10*3/MM3 (ref 0.1–0.9)
NEUTROPHILS # BLD AUTO: 2.34 10*3/MM3 (ref 1.7–7)
NEUTROPHILS NFR BLD MANUAL: 40 % (ref 42.7–76)
NITRITE UR QL STRIP: NEGATIVE
NRBC BLD AUTO-RTO: 0 /100 WBC (ref 0–0.2)
OVALOCYTES BLD QL SMEAR: ABNORMAL
PH UR STRIP.AUTO: 6.5 [PH] (ref 5–8)
PLAT MORPH BLD: NORMAL
PLATELET # BLD AUTO: 226 10*3/MM3 (ref 140–450)
PMV BLD AUTO: 10.5 FL (ref 6–12)
POIKILOCYTOSIS BLD QL SMEAR: ABNORMAL
POTASSIUM SERPL-SCNC: 4.6 MMOL/L (ref 3.5–5.2)
PROT SERPL-MCNC: 8.3 G/DL (ref 6–8.5)
PROT UR QL STRIP: NEGATIVE
RBC # BLD AUTO: 5 10*6/MM3 (ref 3.77–5.28)
RBC # UR STRIP: ABNORMAL /HPF
REF LAB TEST METHOD: ABNORMAL
SODIUM SERPL-SCNC: 136 MMOL/L (ref 136–145)
SP GR UR STRIP: 1.02 (ref 1–1.03)
SQUAMOUS #/AREA URNS HPF: ABNORMAL /HPF
T4 FREE SERPL-MCNC: 1.15 NG/DL (ref 0.93–1.7)
TRIGL SERPL-MCNC: 88 MG/DL (ref 0–150)
TSH SERPL DL<=0.05 MIU/L-ACNC: 2.77 UIU/ML (ref 0.27–4.2)
UROBILINOGEN UR QL STRIP: ABNORMAL
VARIANT LYMPHS NFR BLD MANUAL: 49 % (ref 19.6–45.3)
VLDLC SERPL-MCNC: 17 MG/DL (ref 5–40)
WBC # UR STRIP: ABNORMAL /HPF
WBC MORPH BLD: NORMAL
WBC NRBC COR # BLD: 5.85 10*3/MM3 (ref 3.4–10.8)

## 2022-05-06 PROCEDURE — 82306 VITAMIN D 25 HYDROXY: CPT

## 2022-05-06 PROCEDURE — 85007 BL SMEAR W/DIFF WBC COUNT: CPT

## 2022-05-06 PROCEDURE — 81001 URINALYSIS AUTO W/SCOPE: CPT

## 2022-05-06 PROCEDURE — 87086 URINE CULTURE/COLONY COUNT: CPT

## 2022-05-06 PROCEDURE — 99395 PREV VISIT EST AGE 18-39: CPT | Performed by: INTERNAL MEDICINE

## 2022-05-06 PROCEDURE — 80050 GENERAL HEALTH PANEL: CPT

## 2022-05-06 PROCEDURE — 84439 ASSAY OF FREE THYROXINE: CPT

## 2022-05-06 PROCEDURE — 83036 HEMOGLOBIN GLYCOSYLATED A1C: CPT

## 2022-05-06 PROCEDURE — 80061 LIPID PANEL: CPT

## 2022-05-06 NOTE — PROGRESS NOTES
Chief Complaint   Patient presents with   • Establish Care       HPI:  Elza Rico is a 21 y.o. female who presents today for establish care and annual checkup.  No acute concerns today.  This is an established with behavioral health for anxiety.    ROS:  Constitutional: no fevers, night sweats or unexplained weight loss  Eyes: no vision changes  ENT: no runny nose, ear pain, sore throat  Cardio: no chest pain, palpitations  Pulm: no shortness of breath, wheezing, or cough  GI: no abdominal pain or changes in bowel movements  : no difficulty urinating  MSK: no difficulty ambulating, no joint pain  Neuro: no weakness, dizziness or headache  Psych: no trouble sleeping  Endo: no change in appetite      Past Medical History:   Diagnosis Date   • Allergic     Seasonal   • Anxiety    • Carpal tunnel syndrome    • BENITO (juvenile idiopathic arthritis) (HCC)    • Kidney stone    • Mood disorder (HCC)       Family History   Problem Relation Age of Onset   • Hypertension Mother    • Nephrolithiasis Father    • Hypertension Father    • Thyroid disease Father       Social History     Socioeconomic History   • Marital status: Single   Tobacco Use   • Smoking status: Never Smoker   • Smokeless tobacco: Never Used   Vaping Use   • Vaping Use: Never used   Substance and Sexual Activity   • Alcohol use: Yes     Comment: Rarely   • Drug use: No   • Sexual activity: Not Currently     Partners: Male     Birth control/protection: OCP      No Known Allergies     There is no immunization history on file for this patient.     PE:  Vitals:    05/06/22 0928   BP: 114/70   Pulse: 86   SpO2: 98%      Body mass index is 21.79 kg/m².    Gen Appearance: NAD  HEENT: Normocephalic, PERRLA, no thyromegaly, trache midline  Heart: RRR, normal S1 and S2, no murmur  Lungs: CTA b/l, no wheezing, no crackles  Abdomen: Soft, non-tender, non-distended, no guarding and BSx4  MSK: Moves all extremities well, normal gait, no peripheral edema  Pulses:  Palpable and equal b/l  Lymph nodes: No palpable lymphadenopathy   Neuro: No focal deficits      Current Outpatient Medications   Medication Sig Dispense Refill   • acetaminophen (TYLENOL) 500 MG tablet Take 500 mg by mouth Every 6 (Six) Hours As Needed for Mild Pain .     • Fexofenadine HCl (ALLEGRA PO) Take  by mouth.     • levonorgestrel-ethinyl estradiol (AVIANE,ALESSE,LESSINA) 0.1-20 MG-MCG per tablet Take 1 tablet by mouth Daily. 28 tablet 12   • sertraline (ZOLOFT) 100 MG tablet Take 100 mg by mouth Daily.     • traZODone (DESYREL) 50 MG tablet Take 50 mg by mouth Every Night.       No current facility-administered medications for this visit.        Diagnoses and all orders for this visit:    1. Preventative health care (Primary)  -     CBC & Differential; Future  -     Comprehensive Metabolic Panel; Future  -     Hemoglobin A1c; Future  -     Lipid Panel; Future  -     TSH+Free T4; Future  -     Urinalysis With Culture If Indicated - Urine, Clean Catch; Future  -     Vitamin D 25 Hydroxy; Future  Counseled on healthy weight, nutrition, physical activity, cancer screening, and immunizations.    2. Generalized anxiety disorder    Stable on Zoloft , following with behavioral health.  3. Prediabetes  -     Hemoglobin A1c; Future  5.7 A1c 2 years ago.  Rechecking with lab work today.       Return in about 1 year (around 5/6/2023) for Annual.     Dictated Utilizing Dragon Dictation    Please note that portions of this note were completed with a voice recognition program.    Part of this note may be an electronic transcription/translation of spoken language to printed text using the Dragon Dictation System.

## 2022-05-07 LAB — BACTERIA SPEC AEROBE CULT: NO GROWTH

## 2022-10-04 ENCOUNTER — DOCUMENTATION (OUTPATIENT)
Dept: OBSTETRICS AND GYNECOLOGY | Facility: CLINIC | Age: 22
End: 2022-10-04

## 2022-10-04 RX ORDER — LEVONORGESTREL AND ETHINYL ESTRADIOL 0.1-0.02MG
KIT ORAL
Qty: 28 TABLET | Refills: 0 | Status: SHIPPED | OUTPATIENT
Start: 2022-10-04

## 2022-10-11 ENCOUNTER — OFFICE VISIT (OUTPATIENT)
Dept: OBSTETRICS AND GYNECOLOGY | Facility: CLINIC | Age: 22
End: 2022-10-11

## 2022-10-11 VITALS
SYSTOLIC BLOOD PRESSURE: 120 MMHG | DIASTOLIC BLOOD PRESSURE: 80 MMHG | BODY MASS INDEX: 21.37 KG/M2 | WEIGHT: 120.6 LBS | HEIGHT: 63 IN

## 2022-10-11 DIAGNOSIS — N92.6 IRREGULAR PERIODS: ICD-10-CM

## 2022-10-11 DIAGNOSIS — Z01.419 WOMEN'S ANNUAL ROUTINE GYNECOLOGICAL EXAMINATION: Primary | ICD-10-CM

## 2022-10-11 PROCEDURE — 99395 PREV VISIT EST AGE 18-39: CPT | Performed by: OBSTETRICS & GYNECOLOGY

## 2022-10-11 RX ORDER — NORGESTIMATE AND ETHINYL ESTRADIOL 0.25-0.035
1 KIT ORAL DAILY
Qty: 28 TABLET | Refills: 12 | Status: SHIPPED | OUTPATIENT
Start: 2022-10-11

## 2022-10-11 NOTE — PROGRESS NOTES
Gynecologic Annual Exam Note        Gynecologic Exam (Annual )        Subjective     HPI  Elza Rico is a 21 y.o.  female who presents for annual well woman exam as a established patient. There were no changes to her medical or surgical history since her last visit.. Patient reports problems with: none. Patient's last menstrual period was 10/03/2022.. Her periods are irregular occurring every 2-4 weeks, lasting 5-7 days. The flow is light. Dysmenorrhea:none. . Partner Status: Marital Status: single.  She is not currently sexually active. STD testing recommendations have been explained to the patient and she does desire STD testing.    Additional OB/GYN History   Current contraception: contraceptive methods: OCP (estrogen/progesterone)  Desires to: continue contraception  Thromboembolic Disease: none  Age of menarche: 16    History of STD: yes GC/CHLAMYDIA    Last Pap : none.   Last Completed Pap Smear     This patient has no relevant Health Maintenance data.           History of abnormal Pap smear: no  Gardasil status:uncertain if she received the vaccine  Family history of uterine, colon, breast, or ovarian cancer: no  Performs monthly Self-Breast Exam: yes  Exercises Regularly:occasionally  Feelings of Anxiety or Depression: yes - treated with medication  Tobacco Usage?: No       Current Outpatient Medications:   •  acetaminophen (TYLENOL) 500 MG tablet, Take 500 mg by mouth Every 6 (Six) Hours As Needed for Mild Pain ., Disp: , Rfl:   •  Fexofenadine HCl (ALLEGRA PO), Take  by mouth., Disp: , Rfl:   •  levonorgestrel-ethinyl estradiol (AVIANE,ALESSE,LESSINA) 0.1-20 MG-MCG per tablet, Take 1 tablet by mouth once daily, Disp: 28 tablet, Rfl: 0  •  sertraline (ZOLOFT) 100 MG tablet, Take 100 mg by mouth Daily., Disp: , Rfl:   •  traZODone (DESYREL) 50 MG tablet, Take 50 mg by mouth Every Night., Disp: , Rfl:   •  norgestimate-ethinyl estradiol (Sprintec 28) 0.25-35 MG-MCG per tablet, Take 1  tablet by mouth Daily., Disp: 28 tablet, Rfl: 12     Patient is requesting refills of OCP.    OB History        0    Para   0    Term   0       0    AB   0    Living   0       SAB   0    IAB   0    Ectopic   0    Molar   0    Multiple   0    Live Births   0                Health Maintenance   Topic Date Due   • Annual Gynecologic Pelvic and Breast Exam  Never done   • COVID-19 Vaccine (1) Never done   • HPV VACCINES (1 - 2-dose series) Never done   • TDAP/TD VACCINES (1 - Tdap) Never done   • HEPATITIS C SCREENING  Never done   • PAP SMEAR  Never done   • INFLUENZA VACCINE  Never done   • ANNUAL PHYSICAL  2023   • Pneumococcal Vaccine 0-64  Aged Out   • MENINGOCOCCAL VACCINE  Aged Out   • CHLAMYDIA SCREENING  Discontinued       Past Medical History:   Diagnosis Date   • Allergic     Seasonal   • Anxiety    • Carpal tunnel syndrome    • Chlamydia     Have had no other problems   • BENITO (juvenile idiopathic arthritis) (HCC)    • Kidney stone    • Mood disorder (HCC)    • Urinary tract infection 2020    Has been a few years since I've had an UTI        Past Surgical History:   Procedure Laterality Date   • CYSTOSCOPY W/ URETERAL STENT PLACEMENT Left 9/10/2020    Procedure: CYSTOSCOPY, LEFT URETEROSCOPY, STONE BASKETING AND LEFT URETERAL STENT PLACEMENT;  Surgeon: Rashad Amin MD;  Location: [facility];  Service: Urology;  Laterality: Left;       The additional following portions of the patient's history were reviewed and updated as appropriate: allergies, current medications, past family history, past medical history, past social history, past surgical history and problem list.    Review of Systems   Constitutional: Negative.    HENT: Negative.    Eyes: Negative.    Respiratory: Negative.    Cardiovascular: Negative.    Gastrointestinal: Negative.    Endocrine: Negative.    Genitourinary: Positive for menstrual problem.   Musculoskeletal: Negative.    Skin: Negative.    Allergic/Immunologic:  "Negative.    Neurological: Negative.    Hematological: Negative.    Psychiatric/Behavioral: Negative.          I have reviewed and agree with the HPI, ROS, and historical information as entered above. Jr Whitehead MD        Objective   /80   Ht 160 cm (63\")   Wt 54.7 kg (120 lb 9.6 oz)   LMP 10/03/2022   BMI 21.36 kg/m²     Physical Exam  Vitals reviewed. Exam conducted with a chaperone present.   Constitutional:       Appearance: Normal appearance. She is normal weight.   HENT:      Head: Normocephalic and atraumatic.   Cardiovascular:      Rate and Rhythm: Normal rate and regular rhythm.   Pulmonary:      Effort: Pulmonary effort is normal.      Breath sounds: Normal breath sounds.   Chest:   Breasts:     Right: Normal.      Left: Normal.   Abdominal:      General: Abdomen is flat. Bowel sounds are normal.      Palpations: Abdomen is soft.   Genitourinary:     General: Normal vulva.      Vagina: Normal.      Cervix: Normal.      Uterus: Normal.       Adnexa: Right adnexa normal and left adnexa normal.   Musculoskeletal:      Cervical back: Neck supple.   Skin:     General: Skin is warm and dry.   Neurological:      Mental Status: She is alert and oriented to person, place, and time.   Psychiatric:         Mood and Affect: Mood normal.         Behavior: Behavior normal.            Assessment and Plan    Problem List Items Addressed This Visit        Genitourinary and Reproductive     Irregular periods   Other Visit Diagnoses     Women's annual routine gynecological examination    -  Primary    Relevant Orders    LIQUID-BASED PAP SMEAR, [facility] ([facility])          1. GYN annual well woman exam.   2. Reviewed pap guidelines.   3. Encouraged use of condoms for STD prevention.  4. OCP's/Vaginal Ring - Discussed side effects of nausea, BTB, headaches, breast tenderness and slight weight gain in the first three cycles.  Understands risks of blood clots, stroke, and theoretical risk of breast " cancer.  Denies family history of blood clots.  5. Reviewed monthly self breast exams.  Instructed to call with lumps, pain, or breast discharge.    6. Reviewed exercise as a preventative health measures.   7. Reccommended Flu Vaccine in Fall of each year.  8. RTC in 1 year or PRN with problems  Return in about 1 year (around 10/11/2023) for Annual physical.   Will switch OCP due to breakthrough bleeding.       Jr Whitehead MD  10/11/2022

## 2022-10-12 LAB — REF LAB TEST METHOD: NORMAL

## 2023-05-08 ENCOUNTER — OFFICE VISIT (OUTPATIENT)
Dept: FAMILY MEDICINE CLINIC | Facility: CLINIC | Age: 23
End: 2023-05-08
Payer: COMMERCIAL

## 2023-05-08 ENCOUNTER — LAB (OUTPATIENT)
Dept: LAB | Facility: HOSPITAL | Age: 23
End: 2023-05-08
Payer: COMMERCIAL

## 2023-05-08 VITALS
HEIGHT: 62 IN | BODY MASS INDEX: 21.71 KG/M2 | OXYGEN SATURATION: 98 % | WEIGHT: 118 LBS | HEART RATE: 88 BPM | SYSTOLIC BLOOD PRESSURE: 118 MMHG | DIASTOLIC BLOOD PRESSURE: 76 MMHG

## 2023-05-08 DIAGNOSIS — Z00.00 PREVENTATIVE HEALTH CARE: ICD-10-CM

## 2023-05-08 DIAGNOSIS — E55.9 VITAMIN D DEFICIENCY: ICD-10-CM

## 2023-05-08 DIAGNOSIS — Z00.00 PREVENTATIVE HEALTH CARE: Primary | ICD-10-CM

## 2023-05-08 LAB
BACTERIA UR QL AUTO: ABNORMAL /HPF
BASOPHILS # BLD AUTO: 0.03 10*3/MM3 (ref 0–0.2)
BASOPHILS NFR BLD AUTO: 0.5 % (ref 0–1.5)
BILIRUB UR QL STRIP: NEGATIVE
CLARITY UR: CLEAR
COLOR UR: YELLOW
DEPRECATED RDW RBC AUTO: 45.3 FL (ref 37–54)
EOSINOPHIL # BLD AUTO: 0.12 10*3/MM3 (ref 0–0.4)
EOSINOPHIL NFR BLD AUTO: 1.9 % (ref 0.3–6.2)
ERYTHROCYTE [DISTWIDTH] IN BLOOD BY AUTOMATED COUNT: 15.8 % (ref 12.3–15.4)
GLUCOSE UR STRIP-MCNC: NEGATIVE MG/DL
HCT VFR BLD AUTO: 41 % (ref 34–46.6)
HGB BLD-MCNC: 13.1 G/DL (ref 12–15.9)
HGB UR QL STRIP.AUTO: NEGATIVE
HYALINE CASTS UR QL AUTO: ABNORMAL /LPF
IMM GRANULOCYTES # BLD AUTO: 0.01 10*3/MM3 (ref 0–0.05)
IMM GRANULOCYTES NFR BLD AUTO: 0.2 % (ref 0–0.5)
KETONES UR QL STRIP: NEGATIVE
LEUKOCYTE ESTERASE UR QL STRIP.AUTO: ABNORMAL
LYMPHOCYTES # BLD AUTO: 2.33 10*3/MM3 (ref 0.7–3.1)
LYMPHOCYTES NFR BLD AUTO: 36.6 % (ref 19.6–45.3)
MCH RBC QN AUTO: 25.5 PG (ref 26.6–33)
MCHC RBC AUTO-ENTMCNC: 32 G/DL (ref 31.5–35.7)
MCV RBC AUTO: 79.9 FL (ref 79–97)
MONOCYTES # BLD AUTO: 0.52 10*3/MM3 (ref 0.1–0.9)
MONOCYTES NFR BLD AUTO: 8.2 % (ref 5–12)
NEUTROPHILS NFR BLD AUTO: 3.36 10*3/MM3 (ref 1.7–7)
NEUTROPHILS NFR BLD AUTO: 52.6 % (ref 42.7–76)
NITRITE UR QL STRIP: NEGATIVE
NRBC BLD AUTO-RTO: 0.2 /100 WBC (ref 0–0.2)
PH UR STRIP.AUTO: 6.5 [PH] (ref 5–8)
PLATELET # BLD AUTO: 205 10*3/MM3 (ref 140–450)
PMV BLD AUTO: 11.5 FL (ref 6–12)
PROT UR QL STRIP: NEGATIVE
RBC # BLD AUTO: 5.13 10*6/MM3 (ref 3.77–5.28)
RBC # UR STRIP: ABNORMAL /HPF
REF LAB TEST METHOD: ABNORMAL
SP GR UR STRIP: 1.02 (ref 1–1.03)
SQUAMOUS #/AREA URNS HPF: ABNORMAL /HPF
UROBILINOGEN UR QL STRIP: ABNORMAL
WBC # UR STRIP: ABNORMAL /HPF
WBC NRBC COR # BLD: 6.37 10*3/MM3 (ref 3.4–10.8)

## 2023-05-08 PROCEDURE — 81001 URINALYSIS AUTO W/SCOPE: CPT

## 2023-05-08 PROCEDURE — 82306 VITAMIN D 25 HYDROXY: CPT

## 2023-05-08 PROCEDURE — 83036 HEMOGLOBIN GLYCOSYLATED A1C: CPT

## 2023-05-08 PROCEDURE — 80050 GENERAL HEALTH PANEL: CPT

## 2023-05-08 PROCEDURE — 80061 LIPID PANEL: CPT

## 2023-05-08 PROCEDURE — 84439 ASSAY OF FREE THYROXINE: CPT

## 2023-05-08 PROCEDURE — 99395 PREV VISIT EST AGE 18-39: CPT | Performed by: INTERNAL MEDICINE

## 2023-05-08 PROCEDURE — 87086 URINE CULTURE/COLONY COUNT: CPT

## 2023-05-08 RX ORDER — FLUTICASONE PROPIONATE 50 MCG
2 SPRAY, SUSPENSION (ML) NASAL DAILY
COMMUNITY

## 2023-05-08 NOTE — PROGRESS NOTES
Chief Complaint   Patient presents with   • Annual Exam       HPI:  Elza Rico is a 22 y.o. female who presents today for annual exam.  No acute concerns today.    ROS:  Constitutional: no fevers, night sweats or unexplained weight loss  Eyes: no vision changes  ENT: no runny nose, ear pain, sore throat  Cardio: no chest pain, palpitations  Pulm: no shortness of breath, wheezing, or cough  GI: no abdominal pain or changes in bowel movements  : no difficulty urinating  MSK: no difficulty ambulating, no joint pain  Neuro: no weakness, dizziness or headache  Psych: no trouble sleeping  Endo: no change in appetite      Past Medical History:   Diagnosis Date   • Allergic     Seasonal   • Anxiety    • Carpal tunnel syndrome    • Chlamydia 2021    Have had no other problems   • BENITO (juvenile idiopathic arthritis)    • Kidney stone    • Mood disorder    • Urinary tract infection 2020    Has been a few years since I've had an UTI      Family History   Problem Relation Age of Onset   • Hypertension Mother    • Nephrolithiasis Father    • Hypertension Father    • Thyroid disease Father       Social History     Socioeconomic History   • Marital status: Single   Tobacco Use   • Smoking status: Never   • Smokeless tobacco: Never   Vaping Use   • Vaping Use: Never used   Substance and Sexual Activity   • Alcohol use: Not Currently     Comment: Rarely   • Drug use: No   • Sexual activity: Not Currently     Partners: Male     Birth control/protection: Condom, OCP      No Known Allergies     There is no immunization history on file for this patient.     PE:  Vitals:    05/08/23 1228   BP: 118/76   Pulse: 88   SpO2: 98%      Body mass index is 21.58 kg/m².    Gen Appearance: NAD  HEENT: Normocephalic, PERRLA, no thyromegaly, trache midline  Heart: RRR, normal S1 and S2, no murmur  Lungs: CTA b/l, no wheezing, no crackles  Abdomen: Soft, non-tender, non-distended, no guarding and BSx4  MSK: Moves all extremities well, normal  gait, no peripheral edema  Pulses: Palpable and equal b/l  Lymph nodes: No palpable lymphadenopathy   Neuro: No focal deficits      Current Outpatient Medications   Medication Sig Dispense Refill   • acetaminophen (TYLENOL) 500 MG tablet Take 1 tablet by mouth Every 6 (Six) Hours As Needed for Mild Pain.     • Fexofenadine HCl (ALLEGRA PO) Take  by mouth.     • fluticasone (FLONASE) 50 MCG/ACT nasal spray 2 sprays into the nostril(s) as directed by provider Daily.     • norgestimate-ethinyl estradiol (Sprintec 28) 0.25-35 MG-MCG per tablet Take 1 tablet by mouth Daily. 28 tablet 12   • traZODone (DESYREL) 50 MG tablet Take 1 tablet by mouth Every Night.       No current facility-administered medications for this visit.        Diagnoses and all orders for this visit:    1. Preventative health care (Primary)  -     CBC & Differential; Future  -     Comprehensive Metabolic Panel; Future  -     Hemoglobin A1c; Future  -     Lipid Panel; Future  -     TSH+Free T4; Future  -     Urinalysis With Culture If Indicated - Urine, Clean Catch; Future  Counseled on healthy weight, nutrition, physical activity, cancer screening, and immunizations.    2. Vitamin D deficiency  -     Vitamin D,25-Hydroxy; Future         No follow-ups on file.     Dictated Utilizing Dragon Dictation    Please note that portions of this note were completed with a voice recognition program.    Part of this note may be an electronic transcription/translation of spoken language to printed text using the Dragon Dictation System.

## 2023-05-09 LAB
25(OH)D3 SERPL-MCNC: 43.2 NG/ML (ref 30–100)
ALBUMIN SERPL-MCNC: 4.1 G/DL (ref 3.5–5.2)
ALBUMIN/GLOB SERPL: 1.1 G/DL
ALP SERPL-CCNC: 66 U/L (ref 39–117)
ALT SERPL W P-5'-P-CCNC: 16 U/L (ref 1–33)
ANION GAP SERPL CALCULATED.3IONS-SCNC: 8.6 MMOL/L (ref 5–15)
AST SERPL-CCNC: 20 U/L (ref 1–32)
BACTERIA SPEC AEROBE CULT: NO GROWTH
BILIRUB SERPL-MCNC: <0.2 MG/DL (ref 0–1.2)
BUN SERPL-MCNC: 11 MG/DL (ref 6–20)
BUN/CREAT SERPL: 11.1 (ref 7–25)
CALCIUM SPEC-SCNC: 9.6 MG/DL (ref 8.6–10.5)
CHLORIDE SERPL-SCNC: 102 MMOL/L (ref 98–107)
CHOLEST SERPL-MCNC: 134 MG/DL (ref 0–200)
CO2 SERPL-SCNC: 25.4 MMOL/L (ref 22–29)
CREAT SERPL-MCNC: 0.99 MG/DL (ref 0.57–1)
EGFRCR SERPLBLD CKD-EPI 2021: 82.9 ML/MIN/1.73
GLOBULIN UR ELPH-MCNC: 3.7 GM/DL
GLUCOSE SERPL-MCNC: 85 MG/DL (ref 65–99)
HBA1C MFR BLD: 5.7 % (ref 4.8–5.6)
HDLC SERPL-MCNC: 48 MG/DL (ref 40–60)
LDLC SERPL CALC-MCNC: 67 MG/DL (ref 0–100)
LDLC/HDLC SERPL: 1.38 {RATIO}
POTASSIUM SERPL-SCNC: 4.5 MMOL/L (ref 3.5–5.2)
PROT SERPL-MCNC: 7.8 G/DL (ref 6–8.5)
SODIUM SERPL-SCNC: 136 MMOL/L (ref 136–145)
T4 FREE SERPL-MCNC: 1.09 NG/DL (ref 0.93–1.7)
TRIGL SERPL-MCNC: 100 MG/DL (ref 0–150)
TSH SERPL DL<=0.05 MIU/L-ACNC: 3.15 UIU/ML (ref 0.27–4.2)
VLDLC SERPL-MCNC: 19 MG/DL (ref 5–40)

## 2023-06-12 ENCOUNTER — TRANSCRIBE ORDERS (OUTPATIENT)
Dept: LAB | Facility: HOSPITAL | Age: 23
End: 2023-06-12
Payer: COMMERCIAL

## 2023-06-12 ENCOUNTER — LAB (OUTPATIENT)
Dept: LAB | Facility: HOSPITAL | Age: 23
End: 2023-06-12
Payer: COMMERCIAL

## 2023-06-12 DIAGNOSIS — L70.0 ACNE VULGARIS: ICD-10-CM

## 2023-06-12 DIAGNOSIS — L70.0 ACNE VULGARIS: Primary | ICD-10-CM

## 2023-06-12 LAB — POTASSIUM SERPL-SCNC: 5 MMOL/L (ref 3.5–5.2)

## 2023-06-12 PROCEDURE — 84132 ASSAY OF SERUM POTASSIUM: CPT

## 2023-06-12 PROCEDURE — 36415 COLL VENOUS BLD VENIPUNCTURE: CPT

## 2023-09-25 ENCOUNTER — TRANSCRIBE ORDERS (OUTPATIENT)
Dept: LAB | Facility: HOSPITAL | Age: 23
End: 2023-09-25
Payer: COMMERCIAL

## 2023-09-25 ENCOUNTER — LAB (OUTPATIENT)
Dept: LAB | Facility: HOSPITAL | Age: 23
End: 2023-09-25
Payer: COMMERCIAL

## 2023-09-25 DIAGNOSIS — L57.8 NODULAR ELASTOSIS WITH CYSTS AND COMEDONES OF FAVRE AND RACOUCHOT: Primary | ICD-10-CM

## 2023-09-25 DIAGNOSIS — L70.0 ACNE VULGARIS: ICD-10-CM

## 2023-09-25 DIAGNOSIS — L70.0 NODULAR ELASTOSIS WITH CYSTS AND COMEDONES OF FAVRE AND RACOUCHOT: Primary | ICD-10-CM

## 2023-09-25 LAB — POTASSIUM SERPL-SCNC: 4.6 MMOL/L (ref 3.5–5.2)

## 2023-09-25 PROCEDURE — 36415 COLL VENOUS BLD VENIPUNCTURE: CPT

## 2023-09-25 PROCEDURE — 84132 ASSAY OF SERUM POTASSIUM: CPT

## 2023-10-30 RX ORDER — NORGESTIMATE AND ETHINYL ESTRADIOL 0.25-0.035
1 KIT ORAL DAILY
Qty: 28 TABLET | Refills: 0 | Status: SHIPPED | OUTPATIENT
Start: 2023-10-30 | End: 2023-11-03 | Stop reason: SDUPTHER

## 2023-11-03 ENCOUNTER — OFFICE VISIT (OUTPATIENT)
Dept: OBSTETRICS AND GYNECOLOGY | Facility: CLINIC | Age: 23
End: 2023-11-03
Payer: COMMERCIAL

## 2023-11-03 VITALS
HEIGHT: 62 IN | SYSTOLIC BLOOD PRESSURE: 102 MMHG | WEIGHT: 116.6 LBS | BODY MASS INDEX: 21.46 KG/M2 | DIASTOLIC BLOOD PRESSURE: 60 MMHG

## 2023-11-03 DIAGNOSIS — Z01.419 ENCOUNTER FOR ANNUAL ROUTINE GYNECOLOGICAL EXAMINATION: Primary | ICD-10-CM

## 2023-11-03 RX ORDER — NORGESTIMATE AND ETHINYL ESTRADIOL 0.25-0.035
1 KIT ORAL DAILY
Qty: 28 TABLET | Refills: 12 | Status: SHIPPED | OUTPATIENT
Start: 2023-11-03

## 2023-11-03 NOTE — PROGRESS NOTES
Gynecologic Annual Exam Note        Annual Exam        Subjective     HPI  Elza Rico is a 22 y.o.  female who presents for annual well woman exam as a established patient. There were no changes to her medical or surgical history since her last visit.. Patient reports problems with: none. Patient's last menstrual period was 2023 (exact date).. Her periods occur every 25-35 days , lasting 5 days. The flow is moderate.. She reports dysmenorrhea is mild, occurring premenstrually and first 1-2 days of flow. Partner Status: Marital Status: single.  She is sexually active. She has not had new partners.. STD testing recommendations have been explained to the patient and she does desire STD testing.    Additional OB/GYN History   Current contraception: contraceptive methods: OCP (estrogen/progesterone)  Desires to: continue contraception  Thromboembolic Disease: none  Age of menarche: 12    History of STD: yes GC/CHLAMYDIA    Last Pap : 10/11/2022. Results: negative. HPV: not done.   Last Completed Pap Smear            Ordered - PAP SMEAR (Every 3 Years) Ordered on 11/3/2023      10/11/2022  LIQUID-BASED PAP SMEAR, [facility] ([facility])                     History of abnormal Pap smear: no  Gardasil status:uncertain if she received the vaccine  Family history of uterine, colon, breast, or ovarian cancer: no  Performs monthly Self-Breast Exam: yes  Exercises Regularly:yes  Feelings of Anxiety or Depression: no  Tobacco Usage?: No       Current Outpatient Medications:     acetaminophen (TYLENOL) 500 MG tablet, Take 1 tablet by mouth Every 6 (Six) Hours As Needed for Mild Pain., Disp: , Rfl:     Fexofenadine HCl (ALLEGRA PO), Take  by mouth., Disp: , Rfl:     fluticasone (FLONASE) 50 MCG/ACT nasal spray, 2 sprays into the nostril(s) as directed by provider Daily., Disp: , Rfl:     norgestimate-ethinyl estradiol (Sprintec 28) 0.25-35 MG-MCG per tablet, Take 1 tablet by mouth Daily., Disp: 28 tablet,  Rfl: 12    traZODone (DESYREL) 50 MG tablet, Take 1 tablet by mouth Every Night., Disp: , Rfl:      Patient denies the need for medication refills today.    OB History          0    Para   0    Term   0       0    AB   0    Living   0         SAB   0    IAB   0    Ectopic   0    Molar   0    Multiple   0    Live Births   0                Health Maintenance   Topic Date Due    COVID-19 Vaccine (1) Never done    HPV VACCINES (1 - 2-dose series) Never done    TDAP/TD VACCINES (1 - Tdap) Never done    HEPATITIS C SCREENING  Never done    CHLAMYDIA SCREENING  10/06/2022    INFLUENZA VACCINE  Never done    Annual Gynecologic Pelvic and Breast Exam  10/12/2023    ANNUAL PHYSICAL  2024    PAP SMEAR  10/11/2025    Pneumococcal Vaccine 0-64  Aged Out    MENINGOCOCCAL VACCINE  Aged Out       Past Medical History:   Diagnosis Date    Allergic     Seasonal    Anxiety     Carpal tunnel syndrome     Chlamydia     Have had no other problems    BENITO (juvenile idiopathic arthritis)     Kidney stone     Mood disorder     Urinary tract infection 2020    Has been a few years since I've had an UTI        Past Surgical History:   Procedure Laterality Date    CYSTOSCOPY W/ URETERAL STENT PLACEMENT Left 9/10/2020    Procedure: CYSTOSCOPY, LEFT URETEROSCOPY, STONE BASKETING AND LEFT URETERAL STENT PLACEMENT;  Surgeon: Rashad Amin MD;  Location: [facility];  Service: Urology;  Laterality: Left;       The additional following portions of the patient's history were reviewed and updated as appropriate: allergies, current medications, past family history, past medical history, past social history, past surgical history, and problem list.    Review of Systems   Constitutional: Negative.    Respiratory: Negative.     Cardiovascular: Negative.    Gastrointestinal: Negative.    Genitourinary: Negative.    Neurological: Negative.    Psychiatric/Behavioral: Negative.           I have reviewed and agree with the HPI, ROS,  "and historical information as entered above. Jr Whitehead MD          Objective   /60   Ht 157.5 cm (62.01\")   Wt 52.9 kg (116 lb 9.6 oz)   LMP 11/01/2023 (Exact Date)   Breastfeeding No   BMI 21.32 kg/m²     Physical Exam  Vitals reviewed. Exam conducted with a chaperone present.   Constitutional:       Appearance: Normal appearance. She is normal weight.   HENT:      Head: Normocephalic and atraumatic.   Cardiovascular:      Rate and Rhythm: Normal rate and regular rhythm.   Pulmonary:      Effort: Pulmonary effort is normal.      Breath sounds: Normal breath sounds.   Chest:   Breasts:     Right: Normal.      Left: Normal.   Abdominal:      General: Abdomen is flat. Bowel sounds are normal.      Palpations: Abdomen is soft.   Genitourinary:     General: Normal vulva.      Vagina: Normal.      Cervix: Normal.      Uterus: Normal.       Adnexa: Right adnexa normal and left adnexa normal.   Musculoskeletal:      Cervical back: Neck supple.   Skin:     General: Skin is warm and dry.   Neurological:      Mental Status: She is alert and oriented to person, place, and time.   Psychiatric:         Mood and Affect: Mood normal.         Behavior: Behavior normal.            Assessment and Plan    Problem List Items Addressed This Visit    None  Visit Diagnoses       Encounter for annual routine gynecological examination    -  Primary    Relevant Orders    LIQUID-BASED PAP SMEAR WITH HPV GENOTYPING IF ASCUS ([facility])            GYN annual well woman exam.   Reviewed pap guidelines.   Encouraged use of condoms for STD prevention.  Reviewed monthly self breast exams.  Instructed to call with lumps, pain, or breast discharge.    Reviewed exercise as a preventative health measures.   Reccommended Flu Vaccine in Fall of each year.  Return in about 1 year (around 11/3/2024) for Annual physical.      Jr Whitehead MD  11/03/2023         "

## 2023-11-07 LAB — REF LAB TEST METHOD: NORMAL

## 2024-05-01 ENCOUNTER — LAB (OUTPATIENT)
Dept: LAB | Facility: HOSPITAL | Age: 24
End: 2024-05-01
Payer: COMMERCIAL

## 2024-05-01 ENCOUNTER — TRANSCRIBE ORDERS (OUTPATIENT)
Dept: LAB | Facility: HOSPITAL | Age: 24
End: 2024-05-01
Payer: COMMERCIAL

## 2024-05-01 DIAGNOSIS — L70.0 ACNE VULGARIS: Primary | ICD-10-CM

## 2024-05-01 DIAGNOSIS — L70.0 ACNE VULGARIS: ICD-10-CM

## 2024-05-01 LAB
ANION GAP SERPL CALCULATED.3IONS-SCNC: 10 MMOL/L (ref 5–15)
BUN SERPL-MCNC: 13 MG/DL (ref 6–20)
BUN/CREAT SERPL: 14.1 (ref 7–25)
CALCIUM SPEC-SCNC: 9.3 MG/DL (ref 8.6–10.5)
CHLORIDE SERPL-SCNC: 104 MMOL/L (ref 98–107)
CO2 SERPL-SCNC: 24 MMOL/L (ref 22–29)
CREAT SERPL-MCNC: 0.92 MG/DL (ref 0.57–1)
EGFRCR SERPLBLD CKD-EPI 2021: 89.9 ML/MIN/1.73
GLUCOSE SERPL-MCNC: 96 MG/DL (ref 65–99)
POTASSIUM SERPL-SCNC: 4.4 MMOL/L (ref 3.5–5.2)
SODIUM SERPL-SCNC: 138 MMOL/L (ref 136–145)

## 2024-05-01 PROCEDURE — 36415 COLL VENOUS BLD VENIPUNCTURE: CPT

## 2024-05-01 PROCEDURE — 80048 BASIC METABOLIC PNL TOTAL CA: CPT

## 2024-05-13 ENCOUNTER — OFFICE VISIT (OUTPATIENT)
Dept: FAMILY MEDICINE CLINIC | Facility: CLINIC | Age: 24
End: 2024-05-13
Payer: COMMERCIAL

## 2024-05-13 VITALS
DIASTOLIC BLOOD PRESSURE: 68 MMHG | WEIGHT: 120.6 LBS | HEIGHT: 62 IN | OXYGEN SATURATION: 99 % | BODY MASS INDEX: 22.19 KG/M2 | HEART RATE: 65 BPM | SYSTOLIC BLOOD PRESSURE: 114 MMHG

## 2024-05-13 DIAGNOSIS — Z00.00 PREVENTATIVE HEALTH CARE: Primary | ICD-10-CM

## 2024-05-13 DIAGNOSIS — R73.03 PREDIABETES: ICD-10-CM

## 2024-05-13 PROCEDURE — 99395 PREV VISIT EST AGE 18-39: CPT | Performed by: INTERNAL MEDICINE

## 2024-05-13 RX ORDER — SPIRONOLACTONE 50 MG/1
1 TABLET, FILM COATED ORAL DAILY
COMMUNITY
Start: 2024-05-02

## 2024-05-14 NOTE — PROGRESS NOTES
Chief Complaint   Patient presents with    Annual Exam       HPI:  Elza Rico is a 23 y.o. female who presents today for annual exam.    ROS:  Constitutional: no fevers, night sweats or unexplained weight loss  Eyes: no vision changes  ENT: no runny nose, ear pain, sore throat  Cardio: no chest pain, palpitations  Pulm: no shortness of breath, wheezing, or cough  GI: no abdominal pain or changes in bowel movements  : no difficulty urinating  MSK: no difficulty ambulating, no joint pain  Neuro: no weakness, dizziness or headache  Psych: no trouble sleeping  Endo: no change in appetite      Past Medical History:   Diagnosis Date    Allergic     Seasonal    Anxiety     Carpal tunnel syndrome     Chlamydia 2021    Have had no other problems    BENITO (juvenile idiopathic arthritis)     Kidney stone     Mood disorder     Urinary tract infection 2020    Has been a few years since I've had an UTI      Family History   Problem Relation Age of Onset    Hypertension Mother     Nephrolithiasis Father     Hypertension Father     Thyroid disease Father       Social History     Socioeconomic History    Marital status: Single   Tobacco Use    Smoking status: Never    Smokeless tobacco: Never   Vaping Use    Vaping status: Never Used   Substance and Sexual Activity    Alcohol use: Not Currently     Comment: Rarely    Drug use: No    Sexual activity: Yes     Partners: Male     Birth control/protection: Condom, OCP      No Known Allergies     There is no immunization history on file for this patient.     PE:  Vitals:    05/13/24 1216   BP: 114/68   Pulse: 65   SpO2: 99%      Body mass index is 22.05 kg/m².    Gen Appearance: NAD  HEENT: Normocephalic, PERRLA, no thyromegaly, trache midline  Heart: RRR, normal S1 and S2, no murmur  Lungs: CTA b/l, no wheezing, no crackles  Abdomen: Soft, non-tender, non-distended, no guarding and BSx4  MSK: Moves all extremities well, normal gait, no peripheral edema  Pulses: Palpable and equal  b/l  Lymph nodes: No palpable lymphadenopathy   Neuro: No focal deficits      Current Outpatient Medications   Medication Sig Dispense Refill    acetaminophen (TYLENOL) 500 MG tablet Take 1 tablet by mouth Every 6 (Six) Hours As Needed for Mild Pain.      Fexofenadine HCl (ALLEGRA PO) Take  by mouth.      fluticasone (FLONASE) 50 MCG/ACT nasal spray 2 sprays into the nostril(s) as directed by provider Daily.      norgestimate-ethinyl estradiol (Sprintec 28) 0.25-35 MG-MCG per tablet Take 1 tablet by mouth Daily. 28 tablet 12    spironolactone (ALDACTONE) 50 MG tablet Take 1 tablet by mouth Daily.      traZODone (DESYREL) 50 MG tablet Take 1 tablet by mouth Every Night.      tretinoin (RETIN-A) 0.025 % cream APPLY 3 NIGHTS A WEEK, INCREASE AS TOLERATED       No current facility-administered medications for this visit.        Diagnoses and all orders for this visit:    1. Preventative health care (Primary)  Counseled on healthy weight, nutrition, physical activity, cancer screening, and immunizations.    2. Prediabetes  Counseled on lifestyle changes.       No follow-ups on file.     Dictated Utilizing Dragon Dictation    Please note that portions of this note were completed with a voice recognition program.    Part of this note may be an electronic transcription/translation of spoken language to printed text using the Dragon Dictation System.

## 2024-05-21 ENCOUNTER — LAB (OUTPATIENT)
Dept: LAB | Facility: HOSPITAL | Age: 24
End: 2024-05-21
Payer: COMMERCIAL

## 2024-05-21 DIAGNOSIS — Z00.00 PREVENTATIVE HEALTH CARE: ICD-10-CM

## 2024-05-21 DIAGNOSIS — R73.03 PREDIABETES: ICD-10-CM

## 2024-05-21 DIAGNOSIS — Z00.00 PREVENTATIVE HEALTH CARE: Primary | ICD-10-CM

## 2024-05-21 LAB
ALBUMIN SERPL-MCNC: 4.2 G/DL (ref 3.5–5.2)
ALBUMIN/GLOB SERPL: 1.3 G/DL
ALP SERPL-CCNC: 76 U/L (ref 39–117)
ALT SERPL W P-5'-P-CCNC: 10 U/L (ref 1–33)
ANION GAP SERPL CALCULATED.3IONS-SCNC: 10.2 MMOL/L (ref 5–15)
AST SERPL-CCNC: 12 U/L (ref 1–32)
BILIRUB SERPL-MCNC: 0.3 MG/DL (ref 0–1.2)
BILIRUB UR QL STRIP: NEGATIVE
BUN SERPL-MCNC: 12 MG/DL (ref 6–20)
BUN/CREAT SERPL: 15.2 (ref 7–25)
CALCIUM SPEC-SCNC: 9.5 MG/DL (ref 8.6–10.5)
CHLORIDE SERPL-SCNC: 102 MMOL/L (ref 98–107)
CHOLEST SERPL-MCNC: 130 MG/DL (ref 0–200)
CLARITY UR: CLEAR
CO2 SERPL-SCNC: 24.8 MMOL/L (ref 22–29)
COLOR UR: YELLOW
CREAT SERPL-MCNC: 0.79 MG/DL (ref 0.57–1)
EGFRCR SERPLBLD CKD-EPI 2021: 107.9 ML/MIN/1.73
GLOBULIN UR ELPH-MCNC: 3.3 GM/DL
GLUCOSE SERPL-MCNC: 78 MG/DL (ref 65–99)
GLUCOSE UR STRIP-MCNC: NEGATIVE MG/DL
HBA1C MFR BLD: 5.5 % (ref 4.8–5.6)
HDLC SERPL-MCNC: 48 MG/DL (ref 40–60)
HGB UR QL STRIP.AUTO: NEGATIVE
HOLD SPECIMEN: NORMAL
KETONES UR QL STRIP: ABNORMAL
LDLC SERPL CALC-MCNC: 66 MG/DL (ref 0–100)
LDLC/HDLC SERPL: 1.36 {RATIO}
LEUKOCYTE ESTERASE UR QL STRIP.AUTO: NEGATIVE
NITRITE UR QL STRIP: NEGATIVE
PH UR STRIP.AUTO: 6 [PH] (ref 5–8)
POTASSIUM SERPL-SCNC: 4.6 MMOL/L (ref 3.5–5.2)
PROT SERPL-MCNC: 7.5 G/DL (ref 6–8.5)
PROT UR QL STRIP: NEGATIVE
SODIUM SERPL-SCNC: 137 MMOL/L (ref 136–145)
SP GR UR STRIP: 1.02 (ref 1–1.03)
T4 FREE SERPL-MCNC: 1.25 NG/DL (ref 0.93–1.7)
TRIGL SERPL-MCNC: 83 MG/DL (ref 0–150)
TSH SERPL DL<=0.05 MIU/L-ACNC: 2.32 UIU/ML (ref 0.27–4.2)
UROBILINOGEN UR QL STRIP: ABNORMAL
VLDLC SERPL-MCNC: 16 MG/DL (ref 5–40)

## 2024-05-21 PROCEDURE — 82306 VITAMIN D 25 HYDROXY: CPT

## 2024-05-21 PROCEDURE — 80050 GENERAL HEALTH PANEL: CPT

## 2024-05-21 PROCEDURE — 81003 URINALYSIS AUTO W/O SCOPE: CPT

## 2024-05-21 PROCEDURE — 84439 ASSAY OF FREE THYROXINE: CPT

## 2024-05-21 PROCEDURE — 83036 HEMOGLOBIN GLYCOSYLATED A1C: CPT

## 2024-05-21 PROCEDURE — 80061 LIPID PANEL: CPT

## 2024-05-22 LAB
25(OH)D3 SERPL-MCNC: 45.2 NG/ML (ref 30–100)
BASOPHILS # BLD AUTO: 0.02 10*3/MM3 (ref 0–0.2)
BASOPHILS NFR BLD AUTO: 0.5 % (ref 0–1.5)
DEPRECATED RDW RBC AUTO: 39.9 FL (ref 37–54)
EOSINOPHIL # BLD AUTO: 0.07 10*3/MM3 (ref 0–0.4)
EOSINOPHIL NFR BLD AUTO: 1.7 % (ref 0.3–6.2)
ERYTHROCYTE [DISTWIDTH] IN BLOOD BY AUTOMATED COUNT: 13.2 % (ref 12.3–15.4)
HCT VFR BLD AUTO: 38.2 % (ref 34–46.6)
HGB BLD-MCNC: 12.7 G/DL (ref 12–15.9)
IMM GRANULOCYTES # BLD AUTO: 0.01 10*3/MM3 (ref 0–0.05)
IMM GRANULOCYTES NFR BLD AUTO: 0.2 % (ref 0–0.5)
LYMPHOCYTES # BLD AUTO: 2.08 10*3/MM3 (ref 0.7–3.1)
LYMPHOCYTES NFR BLD AUTO: 51.6 % (ref 19.6–45.3)
MCH RBC QN AUTO: 28.2 PG (ref 26.6–33)
MCHC RBC AUTO-ENTMCNC: 33.2 G/DL (ref 31.5–35.7)
MCV RBC AUTO: 84.7 FL (ref 79–97)
MONOCYTES # BLD AUTO: 0.43 10*3/MM3 (ref 0.1–0.9)
MONOCYTES NFR BLD AUTO: 10.7 % (ref 5–12)
NEUTROPHILS NFR BLD AUTO: 1.42 10*3/MM3 (ref 1.7–7)
NEUTROPHILS NFR BLD AUTO: 35.3 % (ref 42.7–76)
NRBC BLD AUTO-RTO: 0 /100 WBC (ref 0–0.2)
PLATELET # BLD AUTO: 139 10*3/MM3 (ref 140–450)
PMV BLD AUTO: 10.8 FL (ref 6–12)
RBC # BLD AUTO: 4.51 10*6/MM3 (ref 3.77–5.28)
WBC NRBC COR # BLD AUTO: 4.03 10*3/MM3 (ref 3.4–10.8)

## 2024-05-28 DIAGNOSIS — D69.6 THROMBOCYTOPENIA: Primary | ICD-10-CM

## 2024-06-25 ENCOUNTER — OFFICE VISIT (OUTPATIENT)
Dept: FAMILY MEDICINE CLINIC | Facility: CLINIC | Age: 24
End: 2024-06-25
Payer: COMMERCIAL

## 2024-06-25 VITALS
OXYGEN SATURATION: 98 % | BODY MASS INDEX: 21.2 KG/M2 | SYSTOLIC BLOOD PRESSURE: 116 MMHG | WEIGHT: 115.2 LBS | DIASTOLIC BLOOD PRESSURE: 72 MMHG | HEIGHT: 62 IN | HEART RATE: 76 BPM

## 2024-06-25 DIAGNOSIS — I83.92 VARICOSE VEINS OF LEFT LOWER EXTREMITY, UNSPECIFIED WHETHER COMPLICATED: Primary | ICD-10-CM

## 2024-06-25 PROCEDURE — 99213 OFFICE O/P EST LOW 20 MIN: CPT | Performed by: INTERNAL MEDICINE

## 2024-06-25 NOTE — PROGRESS NOTES
Chief Complaint   Patient presents with    Varicose Veins     Noticed in April, leg gets sore after standing for long periods of time       HPI:  Elza Rico is a 23 y.o. female who presents today for evaluation of varicose vein of left thigh.  Denies any pain.  No swelling.  Noticed this a few weeks ago.    ROS:  Constitutional: no fevers, night sweats or unexplained weight loss  Eyes: no vision changes  ENT: no runny nose, ear pain, sore throat  Cardio: no chest pain, palpitations  Pulm: no shortness of breath, wheezing, or cough  GI: no abdominal pain or changes in bowel movements  : no difficulty urinating  MSK: no difficulty ambulating, no joint pain  Neuro: no weakness, dizziness or headache  Psych: no trouble sleeping  Endo: no change in appetite      Past Medical History:   Diagnosis Date    Allergic     Seasonal    Anxiety     Carpal tunnel syndrome     Chlamydia 2021    Have had no other problems    BENITO (juvenile idiopathic arthritis)     Kidney stone     Mood disorder     Urinary tract infection 2020    Has been a few years since I've had an UTI      Family History   Problem Relation Age of Onset    Hypertension Mother     Nephrolithiasis Father     Hypertension Father     Thyroid disease Father       Social History     Socioeconomic History    Marital status: Single   Tobacco Use    Smoking status: Never    Smokeless tobacco: Never   Vaping Use    Vaping status: Never Used   Substance and Sexual Activity    Alcohol use: Not Currently     Comment: Rarely    Drug use: No    Sexual activity: Not Currently     Partners: Male     Birth control/protection: Condom, OCP      No Known Allergies     There is no immunization history on file for this patient.     PE:  Vitals:    06/25/24 1105   BP: 116/72   Pulse: 76   SpO2: 98%      Body mass index is 21.06 kg/m².    Gen Appearance: NAD  HEENT: Normocephalic, PERRLA, no thyromegaly, trache midline  Heart: RRR, normal S1 and S2, no murmur  Lungs: CTA b/l,  no wheezing, no crackles  Abdomen: Soft, non-tender, non-distended, no guarding and BSx4  MSK: Moves all extremities well, normal gait, no peripheral edema  Pulses: Palpable and equal b/l  Lymph nodes: No palpable lymphadenopathy   Neuro: No focal deficits      Current Outpatient Medications   Medication Sig Dispense Refill    acetaminophen (TYLENOL) 500 MG tablet Take 1 tablet by mouth Every 6 (Six) Hours As Needed for Mild Pain.      Fexofenadine HCl (ALLEGRA PO) Take  by mouth.      fluticasone (FLONASE) 50 MCG/ACT nasal spray 2 sprays into the nostril(s) as directed by provider Daily.      norgestimate-ethinyl estradiol (Sprintec 28) 0.25-35 MG-MCG per tablet Take 1 tablet by mouth Daily. 28 tablet 12    spironolactone (ALDACTONE) 50 MG tablet Take 1 tablet by mouth Daily.      traZODone (DESYREL) 50 MG tablet Take 1 tablet by mouth Every Night.      tretinoin (RETIN-A) 0.025 % cream APPLY 3 NIGHTS A WEEK, INCREASE AS TOLERATED       No current facility-administered medications for this visit.      Discussed checking ultrasound to further evaluate.  Asymptomatic right now.  She would like to monitor symptoms.  Discussed conservative ways to reduce varicose veins.    Diagnoses and all orders for this visit:    1. Varicose veins of left lower extremity, unspecified whether complicated (Primary)         No follow-ups on file.     Dictated Utilizing Dragon Dictation    Please note that portions of this note were completed with a voice recognition program.    Part of this note may be an electronic transcription/translation of spoken language to printed text using the Dragon Dictation System.

## 2024-08-12 ENCOUNTER — OFFICE VISIT (OUTPATIENT)
Dept: ORTHOPEDIC SURGERY | Facility: CLINIC | Age: 24
End: 2024-08-12
Payer: COMMERCIAL

## 2024-08-12 VITALS
HEIGHT: 62 IN | WEIGHT: 114.8 LBS | SYSTOLIC BLOOD PRESSURE: 118 MMHG | DIASTOLIC BLOOD PRESSURE: 72 MMHG | BODY MASS INDEX: 21.12 KG/M2

## 2024-08-12 DIAGNOSIS — M79.641 RIGHT HAND PAIN: ICD-10-CM

## 2024-08-12 DIAGNOSIS — M77.8 TENDINITIS OF EXTENSOR TENDON OF HAND: Primary | ICD-10-CM

## 2024-08-12 DIAGNOSIS — G56.01 CARPAL TUNNEL SYNDROME OF RIGHT WRIST: ICD-10-CM

## 2024-08-12 PROCEDURE — 20550 NJX 1 TENDON SHEATH/LIGAMENT: CPT | Performed by: STUDENT IN AN ORGANIZED HEALTH CARE EDUCATION/TRAINING PROGRAM

## 2024-08-12 PROCEDURE — 99203 OFFICE O/P NEW LOW 30 MIN: CPT | Performed by: STUDENT IN AN ORGANIZED HEALTH CARE EDUCATION/TRAINING PROGRAM

## 2024-08-12 RX ORDER — TRIAMCINOLONE ACETONIDE 40 MG/ML
40 INJECTION, SUSPENSION INTRA-ARTICULAR; INTRAMUSCULAR
Status: COMPLETED | OUTPATIENT
Start: 2024-08-12 | End: 2024-08-12

## 2024-08-12 RX ORDER — LIDOCAINE HYDROCHLORIDE 10 MG/ML
1 INJECTION, SOLUTION EPIDURAL; INFILTRATION; INTRACAUDAL; PERINEURAL
Status: COMPLETED | OUTPATIENT
Start: 2024-08-12 | End: 2024-08-12

## 2024-08-12 RX ADMIN — TRIAMCINOLONE ACETONIDE 40 MG: 40 INJECTION, SUSPENSION INTRA-ARTICULAR; INTRAMUSCULAR at 13:56

## 2024-08-12 RX ADMIN — LIDOCAINE HYDROCHLORIDE 1 ML: 10 INJECTION, SOLUTION EPIDURAL; INFILTRATION; INTRACAUDAL; PERINEURAL at 13:56

## 2024-08-12 NOTE — PROGRESS NOTES
[facility] Orthopedic     Office Visit       Date: 08/12/2024   Patient Name: Elza Rico  MRN: [id number]  YOB: 2000    Referring Physician: Yenifer Ramires PA     Chief Complaint: No chief complaint on file.    History of Present Illness:   Elza Rico is a 23 y.o. female right-hand-dominant seen to clinic complaints of right wrist and hand pain, numbness and tingling.  She reports symptoms have been present since 7/30/2024.  She reports numbness and tingling to her hand that she feels affects mainly the long and ring fingers.  She endorses radial sided wrist pain that is worse with gripping, lifting, pushing and pulling.  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  She has been using anti-inflammatories.  No prior injections.  No recent reinjury.  No other complaints or concerns.    Subjective   Review of Systems:   Review of Systems   Constitutional:  Negative for chills, fever, unexpected weight gain and unexpected weight loss.   HENT:  Negative for congestion, postnasal drip and rhinorrhea.    Eyes:  Negative for blurred vision.   Respiratory:  Negative for shortness of breath.    Cardiovascular:  Negative for leg swelling.   Gastrointestinal:  Negative for abdominal pain, nausea and vomiting.   Genitourinary:  Negative for difficulty urinating.   Musculoskeletal:  Positive for arthralgias. Negative for gait problem, joint swelling and myalgias.   Skin:  Negative for skin lesions and wound.   Neurological:  Negative for dizziness, weakness, light-headedness and numbness.   Hematological:  Does not bruise/bleed easily.   Psychiatric/Behavioral:  Negative for depressed mood.       Pertinent review of systems per HPI    I reviewed the patient's chief complaint, history of present illness, review of systems,  "past medical history, surgical history, family history, social history, medications and allergy list in the EMR on 08/12/2024 and agree with the findings above.    Objective    Vital Signs:   Vitals:    08/12/24 1325   BP: 118/72   Weight: 52.1 kg (114 lb 12.8 oz)   Height: 157.5 cm (62.01\")     BMI: BMI is within normal parameters. No other follow-up for BMI required.     General: No acute distress. Alert and oriented.   Cardiovascular: Palpable radial pulse.   Respiratory: Breathing is nonlabored.     Ortho Exam:  Examination of the right upper extremity demonstrates no deformity.  No skin lesions or abrasions.  No swelling or atrophy.  She is tender at the first center compartment worse at the radial styloid.  Positive Finkelstein's test.  Nontender throughout the A1 pulleys.  Nontender at the thumb CMC joint with negative CMC grind.  The DRUJ is nontender.  Nontender at the ulnar styloid, ulnar fovea, dorsal DRUJ, dorsal TFCC, ECU tendon.  Negative ECU Synergy test.  Negative TFCC ballottement.  The DRUJ is stable to shuck in all positions of the forearm and is symmetric to the contralateral side.  5/5 APB and FDI strength.  Sensation is grossly intact to light touch of the median and ulnar nerve distribution of the hand.  Positive Tinel, Durkan's, Phalen's at the wrist.  Warm and well-perfused distally.          Imaging / Studies:    Imaging Results (Last 24 Hours)       Procedure Component Value Units Date/Time    XR Hand 3+ View Right [[id number]] Resulted: 08/12/24 1344     Updated: 08/12/24 1356    Narrative:      Right Hand X-Ray    Indication: Pain    Views:  PA, Lateral, and Oblique     Comparison:  8/25/2020    Findings:  No fractures or dislocation. No bony lesions. Normal soft tissues.   Maintained joint spaces without osteophyte formation.  The distal ulna   appears somewhat dorsal on the lateral view, however a direct lateral of   the wrist was not obtained.    Impression:   No acute findings      "          Procedure Note:  After reviewing the risks, benefits and alternatives to a steroid injection, which include but are not limited to; hypopigmentation, fat necrosis/atrophy, pain, swelling, bleeding, bruising, damage to nearby nerves/vessels, allergic reaction , transient elevation in blood glucose levels and infection a verbal consent was obtained. A time-out was then performed and the affected hand was prepped with chlorhexadine soap and ethyl chloride was used to numb the skin. The right first extensor compartment was injected with 0.5cc: 0.5cc mixture of Kenalog - 40 mg/ml and Lidocaine - 1% / 2 ml. The injection was well tolerated and a sterile dressing was applied. There were no complications. I advised the patient that they might experience some local discomfort for the next couple days and can apply ice to the site as needed.    Assessment / Plan    Assessment/Plan:   Elza Rico is a 23 y.o. female with right de Quervain's tenosynovitis and right carpal tunnel syndrome.    I discussed with the patient their clinical findings demonstrate de Quervain's tenosynovitis, which is also known as first extensor compartment tendinitis.  We had a lengthy discussion regarding the pathophysiology which was explained to be stenosis of the first dorsal compartment tendons within the tendon sheath against the radial styloid, resulting in pain and inflammation of the tendons. The patient is tender over the radial styloid with a positive Finkelstein's maneuver. Both conservative and surgical options were discussed.  Conservative treatments in the form of: observation, activity modification, anti-inflammatories, hand therapy, thumb spica bracing, and injection were presented.  Operative treatment in the form of first extensor compartment release was presented.  After expressing understanding of all options, the patient elects to proceed with corticosteroid injection today, thumb spica splinting, and  anti-inflammatories. Injection was provided today and tolerated well.  She will continue use of her thumb spica splint and we will hold off on therapy until her follow-up appointment in 4 weeks.    I discussed with the patient their clinical findings demonstrate carpal tunnel syndrome.  The pathophysiology of the condition, including compression of the median nerve in the carpal tunnel, was explained in detail.  It was also discussed that with more severe symptomatology, such as persistent and worsening paresthesias, that permanent nerve damage may result, which may make improvement after surgery less predictable.  Both conservative and surgical options were discussed.  Conservative treatments in the form of: observation, gentle nerve gliding exercises, night time splinting, and injection were presented.  Operative treatment in the form of open carpal tunnel release was presented and reiterated that the goal of surgery is to prevent further compression and damage to the nerve. Further workup with electrodiagnostic studies was also discussed and recommended to determine severity. After expressing understanding of all options, the patient elects to proceed with continued splinting, gentle stretching, and obtaining electrodiagnostic studies. I will see her back with results.  They were agreeable with the plan.  All questions and concerns were addressed.      ICD-10-CM ICD-9-CM   1. Tendinitis of extensor tendon of hand  M77.8 727.05   2. Carpal tunnel syndrome of right wrist  G56.01 354.0   3. Right hand pain  M79.641 729.5     Follow Up:   Return in about 4 weeks (around 9/9/2024) for Follow Up.      Polina Smith MD  [facility]

## 2024-08-12 NOTE — PROGRESS NOTES
Procedure   - Hand/Upper Extremity Injection: R extensor compartment 1 for de Quervain's tenosynovitis on 8/12/2024 1:56 PM  Indications: pain  Details: 27 G needle, radial approach  Medications: 1 mL lidocaine PF 1% 1 %; 40 mg triamcinolone acetonide 40 MG/ML  Outcome: tolerated well, no immediate complications  Procedure, treatment alternatives, risks and benefits explained, specific risks discussed. Consent was given by the patient. Immediately prior to procedure a time out was called to verify the correct patient, procedure, equipment, support staff and site/side marked as required. Patient was prepped and draped in the usual sterile fashion.

## 2024-08-14 ENCOUNTER — TELEPHONE (OUTPATIENT)
Dept: ORTHOPEDIC SURGERY | Facility: CLINIC | Age: 24
End: 2024-08-14
Payer: COMMERCIAL

## 2024-08-14 DIAGNOSIS — G56.01 CARPAL TUNNEL SYNDROME OF RIGHT WRIST: Primary | ICD-10-CM

## 2024-08-14 NOTE — TELEPHONE ENCOUNTER
Caller: CHACHA HOYOS     Relationship: [employer]     Best call back number: [phone]    What form or medical record are you requesting:   PROGNOTES, WORK STATUS, AND ORDERS FROM 08/12/24     How would you like to receive the form or medical records (pick-up, mail, fax): FAX  If fax, what is the fax number: [phone]

## 2024-08-20 DIAGNOSIS — G56.01 CARPAL TUNNEL SYNDROME OF RIGHT WRIST: ICD-10-CM

## 2024-09-09 ENCOUNTER — OFFICE VISIT (OUTPATIENT)
Dept: ORTHOPEDIC SURGERY | Facility: CLINIC | Age: 24
End: 2024-09-09
Payer: COMMERCIAL

## 2024-09-09 VITALS
HEIGHT: 62 IN | SYSTOLIC BLOOD PRESSURE: 118 MMHG | WEIGHT: 114 LBS | DIASTOLIC BLOOD PRESSURE: 76 MMHG | BODY MASS INDEX: 20.98 KG/M2

## 2024-09-09 DIAGNOSIS — G56.01 CARPAL TUNNEL SYNDROME OF RIGHT WRIST: Primary | ICD-10-CM

## 2024-09-09 DIAGNOSIS — M77.8 TENDINITIS OF EXTENSOR TENDON OF HAND: ICD-10-CM

## 2024-09-09 PROCEDURE — 20526 THER INJECTION CARP TUNNEL: CPT | Performed by: STUDENT IN AN ORGANIZED HEALTH CARE EDUCATION/TRAINING PROGRAM

## 2024-09-09 PROCEDURE — 99213 OFFICE O/P EST LOW 20 MIN: CPT | Performed by: STUDENT IN AN ORGANIZED HEALTH CARE EDUCATION/TRAINING PROGRAM

## 2024-09-09 RX ORDER — LIDOCAINE HYDROCHLORIDE 10 MG/ML
0.5 INJECTION, SOLUTION EPIDURAL; INFILTRATION; INTRACAUDAL; PERINEURAL
Status: COMPLETED | OUTPATIENT
Start: 2024-09-09 | End: 2024-09-09

## 2024-09-09 RX ORDER — TRIAMCINOLONE ACETONIDE 40 MG/ML
20 INJECTION, SUSPENSION INTRA-ARTICULAR; INTRAMUSCULAR
Status: COMPLETED | OUTPATIENT
Start: 2024-09-09 | End: 2024-09-09

## 2024-09-09 RX ADMIN — TRIAMCINOLONE ACETONIDE 20 MG: 40 INJECTION, SUSPENSION INTRA-ARTICULAR; INTRAMUSCULAR at 13:40

## 2024-09-09 RX ADMIN — LIDOCAINE HYDROCHLORIDE 0.5 ML: 10 INJECTION, SOLUTION EPIDURAL; INFILTRATION; INTRACAUDAL; PERINEURAL at 13:40

## 2024-09-09 NOTE — PROGRESS NOTES
[facility] Orthopedic     Office Visit       Date: 09/09/2024   Patient Name: Elza Rico  MRN: [id number]  YOB: 2000    Referring Physician: No ref. provider found     Chief Complaint:   Chief Complaint   Patient presents with    Follow-up     4 week follow-up: Tendinitis of extensor tendon of hand     History of Present Illness:   Elza Rico is a 23 y.o. female right-hand-dominant presenting to clinic for follow-up of right de Quervain's tenosynovitis and right carpal tunnel syndrome.  The patient's last clinic visit she was provided an injection into the right first extensor compartment.  She is also provided a thumb spica splint and a carpal tunnel brace to be worn at night.  She has obtained her EMG and presents today with results. She reports that the injection to her first center compartment improved her symptoms.  She has not had radial sided wrist pain.  She does continue with the custom thumb spica splint.  She does continue to endorse numbness and tingling in the hand affecting primarily the thumb index and long fingers.  Otherwise no other complaints or concerns.    Therapeutic and diagnostic steroid injection    Subjective   Review of Systems:   Review of Systems   Constitutional: Negative.  Negative for chills, fatigue and fever.   HENT: Negative.  Negative for congestion and dental problem.    Eyes: Negative.  Negative for blurred vision.   Respiratory: Negative.  Negative for shortness of breath.    Cardiovascular: Negative.  Negative for leg swelling.   Gastrointestinal: Negative.  Negative for abdominal pain.   Endocrine: Negative.  Negative for polyuria.   Genitourinary: Negative.  Negative for difficulty urinating.   Musculoskeletal:  Positive for arthralgias.   Skin: Negative.    Allergic/Immunologic: Negative.    Neurological: Negative.    Hematological: Negative.  Negative for adenopathy.  "  Psychiatric/Behavioral: Negative.  Negative for behavioral problems.       Pertinent review of systems per HPI    I reviewed the patient's chief complaint, history of present illness, review of systems, past medical history, surgical history, family history, social history, medications and allergy list in the EMR on 09/09/2024 and agree with the findings above.    Objective    Vital Signs:   Vitals:    09/09/24 1326   BP: 118/76   Weight: 51.7 kg (114 lb)   Height: 157.5 cm (62\")     BMI: BMI is within normal parameters. No other follow-up for BMI required.     General: No acute distress. Alert and oriented.   Cardiovascular: Palpable radial pulse.   Respiratory: Breathing is nonlabored.     Ortho Exam:  Examination of the right upper extremity demonstrates no skin lesions or abrasions.  No atrophy or wasting.  She is nontender over the first center compartment.  Negative Finkelstein's test.  Positive Tinel, Durkan's, Phalen's at the wrist.  Negative Tinel's at the elbow.  Decree sensation to light touch throughout the median nerve distribution and intact throughout the ulnar nerve distribution.  5/5 APB and FDI strength.  Warm well-perfused distally.    Imaging / Studies:    Imaging Results (Last 24 Hours)       ** No results found for the last 24 hours. **        EMG nerve conduction study performed on 8/20/2024 demonstrates very mild right carpal tunnel syndrome.    Procedure Note:  After reviewing the risks, benefits and alternatives to a steroid injection, which include but are not limited to; hypopigmentation, fat necrosis/atrophy, pain, swelling, bleeding, bruising, damage to nearby nerves/vessels, allergic reaction , transient elevation in blood glucose levels and infection a verbal consent was obtained. A time-out was then performed and the affected hand was prepped with chlorhexadine soap and ethyl chloride was used to numb the skin. The right carpal tunnel was injected with 0.5cc: 0.5cc mixture of " Kenalog - 40 mg/ml and Lidocaine - 1% / 2 ml. The injection was well tolerated and a sterile dressing was applied. There were no complications. I advised the patient that they might experience some local discomfort for the next couple days and can apply ice to the site as needed.    Assessment / Plan    Assessment/Plan:   Elza Rico is a 23 y.o. female with right de Quervain's tenosynovitis and right carpal tunnel syndrome.     Patient has had improvements in her right de Quervain's tenosynovitis with a course of rest, bracing, and injection.  I reviewed with her her EMG results demonstrate very mild carpal tunnel syndrome.  She is symptomatic on exam and clinical history.  I recommended a therapeutic and diagnostic corticosteroid injection.  She was agreeable and this was provided and tolerated well.  I will see her back in 6 weeks for reevaluation.  She will continue nighttime splinting and physical therapy directed exercises.  All questions and concerns were addressed.  She was agreeable.      ICD-10-CM ICD-9-CM   1. Carpal tunnel syndrome of right wrist  G56.01 354.0   2. Tendinitis of extensor tendon of hand  M77.8 727.05     Follow Up:   Return in about 6 weeks (around 10/21/2024) for Follow Up.      Polina Smith MD  [facility] [place] [facility]

## 2024-09-09 NOTE — PROGRESS NOTES
Procedure   - Hand/Upper Extremity Injection: R carpal tunnel for carpal tunnel syndrome on 9/9/2024 1:40 PM  Indications: pain  Details: 27 G needle, volar approach  Medications: 0.5 mL lidocaine PF 1% 1 %; 20 mg triamcinolone acetonide 40 MG/ML  Outcome: tolerated well, no immediate complications  Procedure, treatment alternatives, risks and benefits explained, specific risks discussed. Consent was given by the patient. Immediately prior to procedure a time out was called to verify the correct patient, procedure, equipment, support staff and site/side marked as required. Patient was prepped and draped in the usual sterile fashion.

## 2024-10-21 ENCOUNTER — OFFICE VISIT (OUTPATIENT)
Dept: ORTHOPEDIC SURGERY | Facility: CLINIC | Age: 24
End: 2024-10-21
Payer: COMMERCIAL

## 2024-10-21 VITALS
BODY MASS INDEX: 20.98 KG/M2 | DIASTOLIC BLOOD PRESSURE: 74 MMHG | WEIGHT: 114 LBS | HEIGHT: 62 IN | SYSTOLIC BLOOD PRESSURE: 118 MMHG

## 2024-10-21 DIAGNOSIS — G56.01 CARPAL TUNNEL SYNDROME OF RIGHT WRIST: Primary | ICD-10-CM

## 2024-10-21 DIAGNOSIS — M77.8 TENDINITIS OF EXTENSOR TENDON OF HAND: ICD-10-CM

## 2024-10-21 PROCEDURE — 99213 OFFICE O/P EST LOW 20 MIN: CPT | Performed by: STUDENT IN AN ORGANIZED HEALTH CARE EDUCATION/TRAINING PROGRAM

## 2024-10-21 NOTE — PROGRESS NOTES
"                                                                 [facility] Orthopedic     Office Visit       Date: 10/21/2024   Patient Name: Elza Rico  MRN: [id number]  YOB: 2000    Referring Physician: No ref. provider found     Chief Complaint:   Chief Complaint   Patient presents with    Follow-up     6 week follow-up: Carpal tunnel syndrome of right wrist       History of Present Illness:   Elza Rico is a 23 y.o. female right-hand-dominant presented to clinic for follow-up of right de Quervain's synovitis and right carpal tunnel syndrome.  The patient's last clinic visit she received a corticosteroid injection into the right carpal tunnel.  The patient had improvements in her de Quervain's tenosynovitis with a course of bracing and injection.  She reports that her carpal tunnel injection improved her symptoms nearly 100%.  She has been doing well and has returned to work.  She does endorse some very mild radial sided wrist pain, 1/10.  She denies numbness or tingling.  She does report a dark spot/bruise overlying the first extensor compartment.  No recent injury or trauma.  No other complaints or concerns.    Subjective   Review of Systems:   Review of Systems   Pertinent review of systems per HPI    I reviewed the patient's chief complaint, history of present illness, review of systems, past medical history, surgical history, family history, social history, medications and allergy list in the EMR on 10/21/2024 and agree with the findings above.    Objective    Vital Signs:   Vitals:    10/21/24 1354   BP: 118/74   Weight: 51.7 kg (114 lb)   Height: 157.5 cm (62\")     BMI: BMI is within normal parameters. No other follow-up for BMI required.     General: No acute distress. Alert and oriented.   Cardiovascular: Palpable radial pulse.   Respiratory: Breathing is nonlabored.     Ortho Exam:  Examination of the right upper extremity demonstrates a focal area of bruising noted " along the dorsal radial aspect of the first extensor compartment.  There is no hypopigmentation.  She is minimally tender at the first extensor compartment.  Negative Finkelstein's test.  Negative Tinel, Durkan's, Phalen's at the wrist.  Sensations gross intact light touch throughout the median and ulnar nerve distributions.  She is able make composite fist.  She has full wrist range of motion.  Warm and well-perfused distally.    Imaging / Studies:    Imaging Results (Last 24 Hours)       ** No results found for the last 24 hours. **        EMG nerve conduction study performed on 8/20/2024 demonstrates very mild right carpal tunnel syndrome.     Assessment / Plan    Assessment/Plan:   Elza Rico is a 23 y.o. female with right de Quervain's tenosynovitis and right carpal tunnel syndrome.     The patient has done well with a nonoperative course regarding her carpal tunnel and de Quervain's tenosynovitis.  She is responded well to bracing, injections, and therapy.  She has returned to work full duty.  We will continue to monitor her symptoms.  She may benefit from repeat injections versus discussions of surgery in the future if her symptoms return.  I will see her back as needed.  All question concerns were addressed.  She is agreeable.      ICD-10-CM ICD-9-CM   1. Carpal tunnel syndrome of right wrist  G56.01 354.0   2. Tendinitis of extensor tendon of hand  M77.8 727.05     Follow Up:   Return if symptoms worsen or fail to improve.      Polina Smith MD  [facility] [place] [facility]

## 2024-11-13 ENCOUNTER — TELEPHONE (OUTPATIENT)
Dept: OBSTETRICS AND GYNECOLOGY | Facility: CLINIC | Age: 24
End: 2024-11-13

## 2024-11-13 NOTE — TELEPHONE ENCOUNTER
PROVIDER:  DR CAPUTO     CALLER: MARISOL SANTA    PHONE NUMBER: [phone]    REASON FOR CALL: SAME DAY CANCELLATION//NOT ABLE TO MAKE IT//RESCHEDULED

## 2024-11-26 NOTE — PROGRESS NOTES
[facility] Orthopedic     Office Visit       Date: 11/27/2024   Patient Name: Elza Rico  MRN: [id number]  YOB: 2000    Referring Physician: John Holt, *     Chief Complaint:   Chief Complaint   Patient presents with    Follow-up     1 month follow up; Carpal tunnel syndrome of right wrist     History of Present Illness:   Elza Rico is a 24 y.o. female right-hand-dominant presented to clinic for follow-up of right de Quervain's synovitis and right carpal tunnel syndrome.  She reports that since her last clinic visit she is now complaining of ulnar-sided wrist pain.  There is associated pain and swelling with repetitive wrist extension activities.  She feels that her symptoms are worsening with time and is rated a 7/10.  She also states that the numbness and tingling to her hand has returned somewhat but is not as bad as it was initially.  She continues to wear wrist braces.  No other complaints or concerns.    Subjective   Review of Systems:   Review of Systems   Constitutional: Negative.    HENT: Negative.     Eyes: Negative.    Respiratory: Negative.     Cardiovascular: Negative.    Gastrointestinal: Negative.    Endocrine: Negative.    Genitourinary: Negative.    Musculoskeletal:  Positive for arthralgias.   Skin: Negative.    Allergic/Immunologic: Negative.    Neurological: Negative.    Hematological: Negative.    Psychiatric/Behavioral: Negative.        Pertinent review of systems per HPI    I reviewed the patient's chief complaint, history of present illness, review of systems, past medical history, surgical history, family history, social history, medications and allergy list in the EMR on 11/27/2024 and agree with the findings above.    Objective    Vital Signs:   Vitals:    11/27/24 1049   BP: 106/70     BMI: BMI is within normal parameters. No other follow-up for BMI required.     General: No  acute distress. Alert and oriented.   Cardiovascular: Palpable radial pulse.   Respiratory: Breathing is nonlabored.     Ortho Exam:  Examination of the right upper extremity demonstrates no deformity.  Hypopigmentation noted along the first tensor compartment from a previous injection.  No atrophy or wasting.  No skin lesions or abrasions.  She is tender along the ECU tendon with weakly positive ECU Synergy test.  The ECU is stable without subluxation.  Nontender over the ulnar styloid, ulnar fovea, dorsal TFCC, dorsal DRUJ.  The DRUJ is stable to shuck and symmetric to the contralateral side.  She is able to make a composite fist.  Sensations gross intact light touch over the median and ulnar nerve distribution of the hand.  Warm well-perfused distally.    Imaging / Studies:    Imaging Results (Last 24 Hours)       ** No results found for the last 24 hours. **          EMG nerve conduction study performed on 8/20/2024 demonstrates very mild right carpal tunnel syndrome.     Procedure Note:  After reviewing the risks, benefits and alternatives to a steroid injection, which include but are not limited to; hypopigmentation, fat necrosis/atrophy, pain, swelling, bleeding, bruising, damage to nearby nerves/vessels, allergic reaction , transient elevation in blood glucose levels and infection a verbal consent was obtained. A time-out was then performed and the affected hand was prepped with chlorhexadine soap and ethyl chloride was used to numb the skin. The right TFCC/ulnocarpal joint was injected with 0.5cc: 0.5cc mixture of Kenalog - 40 mg/ml and Lidocaine - 1% / 2 ml. The injection was well tolerated and a sterile dressing was applied. There were no complications. I advised the patient that they might experience some local discomfort for the next couple days and can apply ice to the site as needed.    Assessment / Plan    Assessment/Plan:   Elza Rico is a 24 y.o. female with right de Quervain's  tenosynovitis, right carpal tunnel syndrome, and right ECU tendinitis.    I discussed with the patient their clinical and radiographic findings demonstrate ECU tendinitis.  We had a lengthy discussion regarding the pathophysiology of their diagnosis.  We discussed operative and nonoperative treatments.  I recommended a course of nonoperative treatment including anti-inflammatories, a corticosteroid injection, and bracing.  Injection provided today in clinic and tolerated well.  She is also given a Medrol Dosepak.  I would like her to continue therapy directed exercises and bracing.  I will see her back in 6 weeks for reevaluation. They were agreeable with the plan.  All questions and concerns were addressed.       ICD-10-CM ICD-9-CM   1. Carpal tunnel syndrome of right wrist  G56.01 354.0   2. Tendinitis of extensor tendon of hand  M77.8 727.05   3. Extensor carpi ulnaris tendinitis  M77.8 727.05     Follow Up:   Return in about 6 weeks (around 1/8/2025) for Follow Up.      Polina Smith MD  [facility] [place] [facility] Sur[facility]

## 2024-11-27 ENCOUNTER — OFFICE VISIT (OUTPATIENT)
Age: 24
End: 2024-11-27
Payer: COMMERCIAL

## 2024-11-27 VITALS — SYSTOLIC BLOOD PRESSURE: 106 MMHG | DIASTOLIC BLOOD PRESSURE: 70 MMHG

## 2024-11-27 DIAGNOSIS — G56.01 CARPAL TUNNEL SYNDROME OF RIGHT WRIST: Primary | ICD-10-CM

## 2024-11-27 DIAGNOSIS — M77.8 EXTENSOR CARPI ULNARIS TENDINITIS: ICD-10-CM

## 2024-11-27 DIAGNOSIS — M77.8 TENDINITIS OF EXTENSOR TENDON OF HAND: ICD-10-CM

## 2024-11-27 RX ORDER — METHYLPREDNISOLONE 4 MG/1
1 TABLET ORAL DAILY
Qty: 21 TABLET | Refills: 0 | Status: SHIPPED | OUTPATIENT
Start: 2024-11-27

## 2024-11-27 RX ORDER — TRIAMCINOLONE ACETONIDE 40 MG/ML
20 INJECTION, SUSPENSION INTRA-ARTICULAR; INTRAMUSCULAR
Status: COMPLETED | OUTPATIENT
Start: 2024-11-27 | End: 2024-11-27

## 2024-11-27 RX ORDER — LIDOCAINE HYDROCHLORIDE 10 MG/ML
0.5 INJECTION, SOLUTION EPIDURAL; INFILTRATION; INTRACAUDAL; PERINEURAL
Status: COMPLETED | OUTPATIENT
Start: 2024-11-27 | End: 2024-11-27

## 2024-11-27 RX ADMIN — TRIAMCINOLONE ACETONIDE 20 MG: 40 INJECTION, SUSPENSION INTRA-ARTICULAR; INTRAMUSCULAR at 10:59

## 2024-11-27 RX ADMIN — LIDOCAINE HYDROCHLORIDE 0.5 ML: 10 INJECTION, SOLUTION EPIDURAL; INFILTRATION; INTRACAUDAL; PERINEURAL at 10:59

## 2024-11-27 NOTE — PROGRESS NOTES
Procedure   - Medium Joint Arthrocentesis: R jessicacarpal on 11/27/2024 10:59 AM  Indications: pain  Details: (27g short) needle, dorsal approach  Medications: 0.5 mL lidocaine PF 1% 1 %; 20 mg triamcinolone acetonide 40 MG/ML  Outcome: tolerated well, no immediate complications  Procedure, treatment alternatives, risks and benefits explained, specific risks discussed. Consent was given by the patient. Immediately prior to procedure a time out was called to verify the correct patient, procedure, equipment, support staff and site/side marked as required. Patient was prepped and draped in the usual sterile fashion.

## 2024-12-04 ENCOUNTER — OFFICE VISIT (OUTPATIENT)
Dept: OBSTETRICS AND GYNECOLOGY | Facility: CLINIC | Age: 24
End: 2024-12-04
Payer: COMMERCIAL

## 2024-12-04 VITALS
SYSTOLIC BLOOD PRESSURE: 104 MMHG | BODY MASS INDEX: 21.05 KG/M2 | WEIGHT: 114.4 LBS | HEIGHT: 62 IN | DIASTOLIC BLOOD PRESSURE: 66 MMHG

## 2024-12-04 DIAGNOSIS — Z11.3 SCREEN FOR STD (SEXUALLY TRANSMITTED DISEASE): ICD-10-CM

## 2024-12-04 DIAGNOSIS — Z12.4 SCREENING FOR CERVICAL CANCER: Primary | ICD-10-CM

## 2024-12-04 NOTE — PROGRESS NOTES
Gynecologic Annual Exam Note        Annual Exam        Subjective     HPI  Elza Rico is a 24 y.o.  female who presents for annual well woman exam as a established patient. There were no changes to her medical or surgical history since her last visit.. Patient's last menstrual period was 2024. Her periods occur every 28-30 days, lasting 4-5 days.  The flow is light-moderate. She denies dysmenorrhea. Marital Status: single.  She is not currently sexually active. STD testing recommendations have been explained to the patient and she does desire STD testing.    The patient would like to discuss the following complaints today: none    Additional OB/GYN History   contraceptive methods: Condoms  Desires to: do not start contraception  Thromboembolic Disease: none  History of migraines: no  Age of menarche: 12    History of STD: yes GC/CHLAMYDIA     Last Pap : 11/3/2023. Results: negative. HPV:  not done . STD testing: negative  Last Completed Pap Smear            Ordered - PAP SMEAR (Every 3 Years) Ordered on 2023  LIQUID-BASED PAP SMEAR WITH HPV GENOTYPING IF ASCUS ([facility])    10/11/2022  LIQUID-BASED PAP SMEAR, [facility] ([facility])                     History of abnormal Pap smear: no  Gardasil status:unsure if she received the vaccine  Family history of uterine, colon, breast, or ovarian cancer: no  Performs monthly Self-Breast Exam: yes  Exercises Regularly:yes  Feelings of Anxiety or Depression: no  Tobacco Usage?: No       Current Outpatient Medications:     Fexofenadine HCl (ALLEGRA PO), Take  by mouth., Disp: , Rfl:     fluticasone (FLONASE) 50 MCG/ACT nasal spray, Administer 2 sprays into the nostril(s) as directed by provider Daily., Disp: , Rfl:     spironolactone (ALDACTONE) 50 MG tablet, Take 1 tablet by mouth Daily., Disp: , Rfl:     tretinoin (RETIN-A) 0.025 % cream, APPLY 3 NIGHTS A WEEK, INCREASE AS TOLERATED, Disp: , Rfl:      Patient denies the need  for medication refills today.    OB History          0    Para   0    Term   0       0    AB   0    Living   0         SAB   0    IAB   0    Ectopic   0    Molar   0    Multiple   0    Live Births   0                Health Maintenance   Topic Date Due    HPV VACCINES (1 - 3-dose series) Never done    TDAP/TD VACCINES (1 - Tdap) Never done    HEPATITIS C SCREENING  Never done    INFLUENZA VACCINE  Never done    COVID-19 Vaccine ( - - season) Never done    Annual Gynecologic Pelvic and Breast Exam  2024    ANNUAL PHYSICAL  2025    MOST FORM  2025    PAP SMEAR  2026    Pneumococcal Vaccine 0-64  Aged Out    CHLAMYDIA SCREENING  Discontinued       Past Medical History:   Diagnosis Date    Allergic     Seasonal    Anxiety     Carpal tunnel syndrome     Chlamydia     Have had no other problems    BENITO (juvenile idiopathic arthritis)     Kidney stone     Mood disorder     Urinary tract infection     Has been a few years since I've had an UTI        Past Surgical History:   Procedure Laterality Date    CYSTOSCOPY W/ URETERAL STENT PLACEMENT Left 09/10/2020    Procedure: CYSTOSCOPY, LEFT URETEROSCOPY, STONE BASKETING AND LEFT URETERAL STENT PLACEMENT;  Surgeon: Rashad Amin MD;  Location: [facility];  Service: Urology;  Laterality: Left;    WISDOM TOOTH EXTRACTION         The additional following portions of the patient's history were reviewed and updated as appropriate: allergies, current medications, past family history, past medical history, past social history, past surgical history, and problem list.    Review of Systems   Constitutional: Negative.    Respiratory: Negative.     Cardiovascular: Negative.    Gastrointestinal: Negative.    Genitourinary: Negative.    Musculoskeletal: Negative.    Neurological: Negative.    Psychiatric/Behavioral: Negative.           I have reviewed and agree with the HPI, ROS, and historical information as entered above. Jr  "Chon Whitehead MD          Objective   /66   Ht 157.5 cm (62.01\")   Wt 51.9 kg (114 lb 6.4 oz)   LMP 11/27/2024   Breastfeeding No   BMI 20.92 kg/m²     Physical Exam  Vitals reviewed. Exam conducted with a chaperone present.   Constitutional:       Appearance: Normal appearance. She is normal weight.   HENT:      Head: Normocephalic and atraumatic.   Cardiovascular:      Rate and Rhythm: Normal rate and regular rhythm.   Pulmonary:      Effort: Pulmonary effort is normal.      Breath sounds: Normal breath sounds.   Chest:   Breasts:     Right: Normal.      Left: Normal.   Abdominal:      General: Abdomen is flat.   Genitourinary:     General: Normal vulva.      Vagina: Normal.      Cervix: Normal.      Uterus: Normal.       Adnexa: Right adnexa normal and left adnexa normal.   Musculoskeletal:      Cervical back: Neck supple.   Skin:     General: Skin is warm and dry.   Neurological:      Mental Status: She is alert and oriented to person, place, and time.   Psychiatric:         Mood and Affect: Mood normal.         Behavior: Behavior normal.            Assessment and Plan    Problem List Items Addressed This Visit    None  Visit Diagnoses       Screening for cervical cancer    -  Primary    Relevant Orders    LIQUID-BASED PAP SMEAR WITH HPV GENOTYPING IF ASCUS ([facility])    Screen for STD (sexually transmitted disease)        Relevant Orders    LIQUID-BASED PAP SMEAR WITH HPV GENOTYPING IF ASCUS ([facility])            GYN annual well woman exam.   Reviewed pap guidelines.   Encouraged use of condoms for STD prevention.  Reviewed monthly self breast exams.  Instructed to call with lumps, pain, or breast discharge.    Reviewed exercise as a preventative health measures.   Reccommended Flu Vaccine in Fall of each year.  RTC in 1 year or PRN with problems  Return in about 1 year (around 12/4/2025) for Annual physical.    Jr Whitehead MD  12/04/2024         "

## 2024-12-06 LAB — REF LAB TEST METHOD: NORMAL

## 2024-12-27 ENCOUNTER — TELEPHONE (OUTPATIENT)
Dept: ORTHOPEDIC SURGERY | Facility: CLINIC | Age: 24
End: 2024-12-27
Payer: COMMERCIAL

## 2024-12-27 NOTE — TELEPHONE ENCOUNTER
Admin Notation: Received a call from the patient, inquiring in regards to status of form. Patient has been advised her provider [facility] has up to 15 business days for completion of forms. Patient asked that we call once forms completed so that she may  a copy. Spreadsheet has been updated also. This is of informative nature only.

## 2025-01-14 ENCOUNTER — OFFICE VISIT (OUTPATIENT)
Age: 25
End: 2025-01-14
Payer: COMMERCIAL

## 2025-01-14 VITALS
WEIGHT: 110 LBS | SYSTOLIC BLOOD PRESSURE: 100 MMHG | HEIGHT: 62 IN | BODY MASS INDEX: 20.24 KG/M2 | DIASTOLIC BLOOD PRESSURE: 78 MMHG

## 2025-01-14 DIAGNOSIS — G56.01 CARPAL TUNNEL SYNDROME OF RIGHT WRIST: Primary | ICD-10-CM

## 2025-01-14 NOTE — PROGRESS NOTES
[facility] Orthopedic     Office Visit       Date: 01/14/2025   Patient Name: Elza Rico  MRN: [id number]  YOB: 2000    Referring Physician: John Holt, *     Chief Complaint:   Chief Complaint   Patient presents with    Follow-up     6.5 week recheck - Carpal tunnel syndrome of right wrist; Tendinitis of extensor tendon of hand; Extensor carpi ulnaris tendinitis          History of Present Illness:   Elza Rico is a 24 y.o. female right-hand-dominant presenting to clinic for follow-up of right carpal tunnel syndrome and right ECU tendinitis.  Patient's last clinic visit she received an injection into the TFCC/ulnocarpal joint.  The patient reports that her ulnar-sided wrist pain to the right has completely resolved.  She is however continuing to have numbness and tingling in the right hand that affects primarily the thumb index and long fingers.  This has responded well to prior injection.  She continues to wear her brace and perform home therapy exercises.  No other complaints or concern regarding the right upper extremity.    Subjective   Review of Systems:   Review of Systems   Constitutional:  Negative for chills, fever, unexpected weight gain and unexpected weight loss.   HENT:  Negative for congestion, postnasal drip and rhinorrhea.    Eyes:  Negative for blurred vision.   Respiratory:  Negative for shortness of breath.    Cardiovascular:  Negative for leg swelling.   Gastrointestinal:  Negative for abdominal pain, nausea and vomiting.   Genitourinary:  Negative for difficulty urinating.   Musculoskeletal:  Positive for arthralgias. Negative for gait problem, joint swelling and myalgias.   Skin:  Negative for skin lesions and wound.   Neurological:  Negative for dizziness, weakness, light-headedness and numbness.   Hematological:  Does not bruise/bleed easily.   Psychiatric/Behavioral:  Negative for  "depressed mood.    All other systems reviewed and are negative.     Pertinent review of systems per HPI    I reviewed the patient's chief complaint, history of present illness, review of systems, past medical history, surgical history, family history, social history, medications and allergy list in the EMR on 01/14/2025 and agree with the findings above.    Objective    Vital Signs:   Vitals:    01/14/25 1300   BP: 100/78   Weight: 49.9 kg (110 lb)   Height: 157.5 cm (62.01\")     General: No acute distress. Alert and oriented.   Cardiovascular: Palpable radial pulse.   Respiratory: Breathing is nonlabored.   Ortho Exam:  Examination right upper extremity demonstrates no deformity.  Hypopigmentation noted along the first extensor compartment and ulnar aspect of the wrist from sites of previous injection.  Significant atrophy or wasting.  Nontender along the ECU tendon with negative ECU Synergy test.  The ulnar aspect of the wrist is nontender.  Positive Tinel, Durkan's, Phalen's.  Sensation is grossly intact light touch over the median and ulnar nerve distributions of the hand.  Warm and well-perfused distally.    Imaging / Studies:    Imaging Results (Last 24 Hours)       ** No results found for the last 24 hours. **          EMG nerve conduction study performed on 8/20/2024 demonstrates very mild right carpal tunnel syndrome.     Assessment / Plan    Assessment/Plan:   Elza Rico is a 24 y.o. female with right carpal tunnel syndrome and improved right de Quervain's tenosynovitis and right ECU tendinitis.    The patient is having recurrent carpal tunnel symptoms.  She has responded well to a corticosteroid injection in the past.  I discussed with her options moving forward.  These include continued nonoperative treatment including bracing, nerve glides, and injection.  We also discussed operative treatment.  She is most interested in definitive treatment.  I explained to her the risk, benefits, alternatives " and prognosis, she looks to proceed with right carpal tunnel release.    The risks and benefits of the procedure were discussed with the patient and/or appropriate guardian, which include but are not limited to: the risk of bleeding, pain, infection, wound complications, neurovascular damage, post-operative stiffness, tendon and/or ligament injury, persistent pain, need for additional surgeries in the future, and general risks from anesthesia. Carpal tunnel risks: Specific risks include: persistent numbness and tingling as the goal of surgery is to prevent further worsening of symptoms, damage to palmar cutaneous nerve, damage to recurrent motor branch as a portion of these branches are transligamentous, persistent weakness and pillar pain. We also discussed the post-operative rehabilitation, length of immobilization, the need for therapy, and the overall expected outcomes from the procedure. Proper time was given to answer the patient's questions regarding the procedure. The patient expressed understanding. Knowing what the risks are and what the conservative treatment is, the patient elected to forgo any further conservative treatment options and proceed with the surgical intervention. Surgical consent was obtained in the clinic and signed by myself and the patient.  She will follow-up with me postoperatively.      ICD-10-CM ICD-9-CM   1. Carpal tunnel syndrome of right wrist  G56.01 354.0     Follow Up:   Return for Follow Up- After surgery.      Polina Smith MD  [facility] [place] [facility] & Hand Surgeon

## 2025-01-24 ENCOUNTER — DOCUMENTATION (OUTPATIENT)
Dept: ORTHOPEDIC SURGERY | Facility: CLINIC | Age: 25
End: 2025-01-24

## 2025-01-24 ENCOUNTER — OUTSIDE FACILITY SERVICE (OUTPATIENT)
Dept: ORTHOPEDIC SURGERY | Facility: CLINIC | Age: 25
End: 2025-01-24
Payer: COMMERCIAL

## 2025-01-24 PROCEDURE — 64721 CARPAL TUNNEL SURGERY: CPT | Performed by: STUDENT IN AN ORGANIZED HEALTH CARE EDUCATION/TRAINING PROGRAM

## 2025-01-24 NOTE — PROGRESS NOTES
ORTHOPEDIC OPERATIVE REPORT    PATIENT NAME: Elza Rico     MRN: [id number]    LOCATION: Psychiatric    DATE OF SURGERY: 01/24/25    PREOPERATIVE DIAGNOSIS:   Right carpal tunnel syndrome    POSTOPERATIVE DIAGNOSIS:  Right carpal tunnel syndrome    PROCEDURE PERFORMED:  Right carpal tunnel release    CPT CODE:  31282    SURGEON: Polina Smith MD     ASSISTANT: None     ANESTHESIA: 7cc total of  a 50:50 mixture of 0.5% Marcaine mixed with 1% lidocaine with 1:100,000 epinephrine    SPECIMENS: None    TOURNIQUET TIME: 6 minutes     ESTIMATED BLOOD LOSS: Minimal    COMPLICATIONS: None    IMPLANTS: None    INDICATIONS FOR PROCEDURE:  Elza Rico is a 24-year-old right-hand-dominant female who presented to clinic with complaints of numbness and tingling to the right hand involving predominantly the thumb, index, and long fingers. Clinical examination was consistent with carpal tunnel syndrome. Electrodiagnostic studies were obtained. These demonstrated very mild right. The patient was offered conservative treatment including nerve gliding exercises, wrist braces, and corticosteroid injection. Despite treatment with bracing, nerve glides and a corticosteroid injection, they continued to endorse persistent numbness and tingling.  She did have a positive response to prior injection.  Therefore, they were offered an open carpal tunnel release. After discussion of the risks, benefits, alternatives and prognosis, they elected to proceed with right open carpal tunnel release.    DESCRIPTION OF PROCEDURE:   The patient was identified in the preoperative holding area utilizing two patient identifiers. The operative extremity was marked. A preoperative block was performed using local anesthetic consisting of a 50:50 mixture of 0.5% Marcaine mixed with 1% lidocaine with 1:100,000 epinephrine. A total of 7cc was used and injected proximally to block the palmar cutaneous nerve branch and then superficially along the  planned carpal tunnel incision. They were taken back to the operating room and placed supine on the operative table.  A forearm tourniquet was placed and the operative extremity was prepped and draped in a standard sterile fashion. A surgical time out was performed that confirmed the correct site, procedure and laterality. No preoperative antibiotics were indicated.     An incision was drawn out in line with the radial border of the ring finger starting just proximal to Sams's cardinal line and ending 0.5 cm distal to the distal wrist crease. An esmarch was used to exsanguinate the limb, and the tourniquet was inflated to 250 mmHg.  A 15 blade was used to incise through the skin and subcutaneous tissue. Littler scissors were used to bluntly dissect down to the palmar fascia. A self-retaining blunt retractor was placed, exposing the palmar fascia. A 15 blade was used to incise through the palmar fascia in line with the skin incision.  The ulnar based fat was encountered and elevated from radial to ulnar allowing the self-retaining retractor to be placed deeper, retracting the fat from the surgical field. A palmaris brevis was encountered. The transverse carpal ligament as exposed. A 15 blade was used to incise through the transverse carpal ligament into the carpal tunnel under direct visualization. A combination of scissors and a 15 blade were used to complete the dissection distally until fat was encountered from the superficial palmar arch. Once the distal dissection was confirmed completed, the retractor was reversed and attention was turned proximally. A scalpel was used to incise the transverse carpal ligament proximally under direct visualization. A freer elevator was used to release adhesions from superficial and deep to the transverse carpal ligament proximally. Scissors were used to complete the proximal dissection releasing up to the level of the antebrachial fascia. There was no evidence of a  transligamentous recurrent motor branch.     Once the transverse carpal ligament was fully released, a visual inspection of the carpal tunnel demonstrated a complete release without evidence additional pathology. The tourniquet was deflated and hemostasis was achieved at the wound edges. The wound was irrigated with normal saline and closed with 4-0 nylon. 2x2 gauze and an ace wrap were placed. All counts were correct at the end of the case.     POSTOPERATIVE PLAN:  No lifting greater than 5 pounds with the operative extremity.  2.  Over the counter Tylenol and/or Advil/Aleve/Motrin for pain control.   3.  Dressings may be removed in 5 days and replaced with a dry daily dressing.  4.  Follow up in 10-14 days as scheduled.    Polina Smith MD  [facility] [place] [facility] & Hand Surgeon

## 2025-02-06 ENCOUNTER — OFFICE VISIT (OUTPATIENT)
Dept: ORTHOPEDIC SURGERY | Facility: CLINIC | Age: 25
End: 2025-02-06
Payer: COMMERCIAL

## 2025-02-06 VITALS — TEMPERATURE: 97.3 F

## 2025-02-06 DIAGNOSIS — G56.01 CARPAL TUNNEL SYNDROME OF RIGHT WRIST: Primary | ICD-10-CM

## 2025-02-06 PROCEDURE — 99024 POSTOP FOLLOW-UP VISIT: CPT | Performed by: STUDENT IN AN ORGANIZED HEALTH CARE EDUCATION/TRAINING PROGRAM

## 2025-02-06 NOTE — PROGRESS NOTES
[facility] Orthopedic     Post Operative Office Visit      Date: 02/06/2025   Patient Name: Elza Rico  MRN: [id number]  YOB: 2000    Chief Complaint:   Chief Complaint   Patient presents with    Post-op     2 weeks status post Right carpal tunnel release 01/24/2025     History of Present Illness:   Elza Rico is a 24 y.o. female status post right carpal tunnel release, DOS 1/24/2025.  Patient reports doing well since her surgery.  She states the numbness and tingling has completely resolved in the thumb index and long fingers and that overall she feels significantly improved.  She has had no wound complications.  No other complaints or concerns.    Subjective   Review of Systems:   Review of Systems     I reviewed the patient's chief complaint, history of present illness, review of systems, past medical history, surgical history, family history, social history, medications and allergy list in the EMR on 02/06/2025 and agree with the findings above.    Objective    Vital Signs:   Vitals:    02/06/25 1057   Temp: 97.3 °F (36.3 °C)       General Appearance: No acute distress. Alert and oriented.     Ortho Exam:  Examination of the right Upper Extremity:   Skin examination: Surgical incision overlying the carpal tunnel.  Sutures are in place without erythema warmth or drainage.  Sutures removed and there was a small area of superficial wound dehiscence noted distally.   Nontender over the surgical incision.  Able to make a composite fist  AIN/PIN/Radial/Ulnar nerves grossly motor intact  Median/radial/ulnar sensory intact to light touch   Palpable radial pulse, digits are warm and well-perfused       Imaging / Studies:    Imaging Results (Last 24 Hours)       ** No results found for the last 24 hours. **          Assessment / Plan    Assessment/Plan:   Elza Rico is a 24 y.o. female status post right carpal  tunnel release, DOS 1/24/2025.    Patient has done well postoperatively.  Sutures removed today in clinic and I counseled her on gentle scar massage.  Will begin a course of therapy.  We will continue work restrictions of no push pull or lifting greater than 5 pounds.  I anticipate discontinuation of these restrictions at her next clinic visit in 4 weeks.  All question and concerns were addressed.  She is agreeable.      ICD-10-CM ICD-9-CM   1. Carpal tunnel syndrome of right wrist  G56.01 354.0       Follow Up:   Return in about 4 weeks (around 3/6/2025) for Follow Up.      Polina Smith MD  [facility] [place] [facility]

## 2025-02-14 PROCEDURE — 87086 URINE CULTURE/COLONY COUNT: CPT | Performed by: FAMILY MEDICINE

## 2025-02-17 ENCOUNTER — OFFICE VISIT (OUTPATIENT)
Dept: OBSTETRICS AND GYNECOLOGY | Facility: CLINIC | Age: 25
End: 2025-02-17
Payer: COMMERCIAL

## 2025-02-17 VITALS
SYSTOLIC BLOOD PRESSURE: 102 MMHG | BODY MASS INDEX: 21.53 KG/M2 | DIASTOLIC BLOOD PRESSURE: 66 MMHG | HEIGHT: 62 IN | WEIGHT: 117 LBS

## 2025-02-17 DIAGNOSIS — Z87.448 HISTORY OF HYDRONEPHROSIS: ICD-10-CM

## 2025-02-17 DIAGNOSIS — Z87.442 HISTORY OF KIDNEY STONES: ICD-10-CM

## 2025-02-17 DIAGNOSIS — Z87.440 HISTORY OF UTI: Primary | ICD-10-CM

## 2025-02-17 DIAGNOSIS — Z87.448 HISTORY OF PYELONEPHRITIS: ICD-10-CM

## 2025-02-17 LAB
BILIRUB BLD-MCNC: NEGATIVE MG/DL
GLUCOSE UR STRIP-MCNC: NEGATIVE MG/DL
KETONES UR QL: NEGATIVE
LEUKOCYTE EST, POC: NEGATIVE
NITRITE UR-MCNC: NEGATIVE MG/ML
PH UR: 6 [PH] (ref 5–8)
PROT UR STRIP-MCNC: NEGATIVE MG/DL
RBC # UR STRIP: NEGATIVE /UL
SP GR UR: 1.01 (ref 1–1.03)
UROBILINOGEN UR QL: NORMAL

## 2025-02-17 NOTE — PROGRESS NOTES
Chief Complaint   Patient presents with    Follow-up       Subjective   HPI  Elza Rico is a 24 y.o. female, . Her last LMP was Patient's last menstrual period was 2025.. who presents for UTI. She was seen on 25 by [facility]. Per [facility] note: urinary frequency, dysuria and hematuria.  She had been taking Azo. Denied fever, chills or nausea.  Reported no recent antibiotics. She denied frequent UTI. Urine not interpretable due to use of Azo. However based on her symptoms, treated with Bactrim for UTI. Urine was sent for culture. Per note from yesterday: No growth of any abnormal bacteria. Recommended that she discontinue Bactrim. If she is still having symptoms, she needs to follow-up with her primary care provider or gynecologist for further evaluation at this time. She states that the Bactrim and azo has helped. She did stop taking azo yesterday.          Additional OB/GYN History     Last Pap : 24  Last Completed Pap Smear            PAP SMEAR (Every 3 Years) Next due on 2024  LIQUID-BASED PAP SMEAR WITH HPV GENOTYPING IF ASCUS ([facility])    2023  LIQUID-BASED PAP SMEAR WITH HPV GENOTYPING IF ASCUS ([facility])    10/11/2022  LIQUID-BASED PAP SMEAR, [facility] ([facility])                    Last mammogram: n/a  Last Completed Mammogram       This patient has no relevant Health Maintenance data.            Tobacco Usage?: No   OB History          0    Para   0    Term   0       0    AB   0    Living   0         SAB   0    IAB   0    Ectopic   0    Molar   0    Multiple   0    Live Births   0                  Current Outpatient Medications:     Fexofenadine HCl (ALLEGRA PO), Take  by mouth., Disp: , Rfl:     fluticasone (FLONASE) 50 MCG/ACT nasal spray, Administer 2 sprays into the nostril(s) as directed by provider Daily., Disp: , Rfl:     spironolactone (ALDACTONE) 50 MG tablet, Take 1 tablet by mouth Daily., Disp: , Rfl:      "sulfamethoxazole-trimethoprim (BACTRIM DS,SEPTRA DS) 800-160 MG per tablet, Take 1 tablet by mouth 2 (Two) Times a Day for 5 days., Disp: 10 tablet, Rfl: 0    tretinoin (RETIN-A) 0.025 % cream, APPLY 3 NIGHTS A WEEK, INCREASE AS TOLERATED, Disp: , Rfl:      Past Medical History:   Diagnosis Date    Allergic     Seasonal    Anxiety     Carpal tunnel syndrome     Chlamydia 2021    Have had no other problems    BENITO (juvenile idiopathic arthritis)     Kidney stone     Mood disorder     Urinary tract infection 2020    Has been a few years since I've had an UTI        Past Surgical History:   Procedure Laterality Date    CARPAL TUNNEL RELEASE Right 01/24/2025    CYSTOSCOPY W/ URETERAL STENT PLACEMENT Left 09/10/2020    Procedure: CYSTOSCOPY, LEFT URETEROSCOPY, STONE BASKETING AND LEFT URETERAL STENT PLACEMENT;  Surgeon: Rashad Amin MD;  Location: [facility];  Service: Urology;  Laterality: Left;    WISDOM TOOTH EXTRACTION  2021       The additional following portions of the patient's history were reviewed and updated as appropriate: allergies, current medications, past family history, past medical history, past social history, past surgical history, and problem list.    Review of Systems   All other systems reviewed and are negative.      I have reviewed and agree with the HPI, ROS, and historical information as entered above. Tracey Leger, APRN      Objective   /66   Ht 157.5 cm (62.01\")   Wt 53.1 kg (117 lb)   LMP 02/09/2025   Breastfeeding No   BMI 21.39 kg/m²     Physical Exam  Vitals and nursing note reviewed.   Constitutional:       General: She is not in acute distress.     Appearance: Normal appearance. She is not ill-appearing, toxic-appearing or diaphoretic.   Pulmonary:      Effort: Pulmonary effort is normal. No respiratory distress.   Neurological:      Mental Status: She is alert and oriented to person, place, and time.   Psychiatric:         Mood and Affect: Mood normal.         Behavior: " Behavior normal.         Thought Content: Thought content normal.         Judgment: Judgment normal.         Assessment & Plan     Assessment     Problem List Items Addressed This Visit          Genitourinary and Reproductive     History of kidney stones     Other Visit Diagnoses       History of UTI    -  Primary    Relevant Orders    POC Urinalysis Dipstick (Completed)    Urine Culture - Urine, Urine, Clean Catch    History of pyelonephritis        History of hydronephrosis                Plan     GYN follow up  Patient feeling much better after taking bactrim-encouraged to complete due to her complicated history of utis, kidney stones, pyelo and hydronephrosis.  Call if fever or pain returns.         Tracey Leger, APRN  02/17/2025

## 2025-02-19 LAB
BACTERIA UR CULT: NORMAL
BACTERIA UR CULT: NORMAL

## 2025-03-06 ENCOUNTER — OFFICE VISIT (OUTPATIENT)
Dept: ORTHOPEDIC SURGERY | Facility: CLINIC | Age: 25
End: 2025-03-06
Payer: OTHER MISCELLANEOUS

## 2025-03-06 DIAGNOSIS — G56.01 CARPAL TUNNEL SYNDROME OF RIGHT WRIST: Primary | ICD-10-CM

## 2025-03-06 PROCEDURE — 99024 POSTOP FOLLOW-UP VISIT: CPT | Performed by: STUDENT IN AN ORGANIZED HEALTH CARE EDUCATION/TRAINING PROGRAM

## 2025-03-06 NOTE — PROGRESS NOTES
[facility] Orthopedic     Post Operative Office Visit      Date: 03/06/2025   Patient Name: Elza Rico  MRN: [id number]  YOB: 2000    Chief Complaint:   Chief Complaint   Patient presents with    Post-op     1 month follow up -- 6 weeks status post Right carpal tunnel release 01/24/2025     History of Present Illness:   Elza Rico is a 24 y.o. female status post right carpal tunnel release, DOS 1/24/2025.  Patient is in well since her last clinic visit.  She states her numbness and tingling has completely resolved.  She continues to work with the therapist and has had improvements in her strength.  No other complaints or concerns.    Subjective   Review of Systems:   Review of Systems     I reviewed the patient's chief complaint, history of present illness, review of systems, past medical history, surgical history, family history, social history, medications and allergy list in the EMR on 03/06/2025 and agree with the findings above.    Objective    Vital Signs: There were no vitals filed for this visit.    General Appearance: No acute distress. Alert and oriented.     Ortho Exam:  Examination of the right Upper Extremity:   Skin examination: Well-healed surgical incision overlying the carpal tunnel.  This is nontender to palpation.  Tender to palpation over thenar musculature  Able to make a composite fist  AIN/PIN/Radial/Ulnar nerves grossly motor intact  Median/radial/ulnar sensory intact to light touch   Palpable radial pulse, digits are warm and well-perfused       Imaging / Studies:    Imaging Results (Last 24 Hours)       ** No results found for the last 24 hours. **          Assessment / Plan    Assessment/Plan:   Elza Rico is a 24 y.o. female status post right carpal tunnel release, DOS 1/24/2025.     The patient has done well postoperatively.  She has had resolution of her numbness and tingling.  I  would like her to continue physical therapy for strengthening prior to return to work.  She may return to work on 3/31/2025 without restrictions.  I will see her back in 6 weeks for reevaluation.  All questions and concerns were addressed.  She is agreeable.      ICD-10-CM ICD-9-CM   1. Carpal tunnel syndrome of right wrist  G56.01 354.0       Follow Up:   Return in about 6 weeks (around 4/17/2025) for Follow Up.      Polina Smith MD  [facility] [place] [facility]

## 2025-03-24 ENCOUNTER — TELEPHONE (OUTPATIENT)
Dept: OBSTETRICS AND GYNECOLOGY | Facility: CLINIC | Age: 25
End: 2025-03-24
Payer: COMMERCIAL

## 2025-03-24 DIAGNOSIS — R10.32 LLQ PAIN: Primary | ICD-10-CM

## 2025-03-24 NOTE — TELEPHONE ENCOUNTER
Spoke with pt. Her LMP was 2/24. She states her periods are usually every 21 days. Before that her LMP was 2/9, only 15 days apart. She states stopped OCP early last year because she felt she was taking too many medications. Not currently trying to conceive, but will be soon. She stated she was 8 days late although she is only on CD 28 today. She took negative HPT.     For the last several days she has had constant but mild pain in her LLQ. States it gets worse with movement, occasional random spasms. Rates pain 2/10, hasn't had to take meds.    Will schedule TVUS to assess for cyst/other causes of pain. Pt is off this week so that works best for her. Scheduled for 3:30 tomorrow with ultrasound, and 4:15 with Tracey Leger.   Pt verbalizes understanding.

## 2025-03-24 NOTE — TELEPHONE ENCOUNTER
Provider:     Caller: Elza Rico    Relationship to Patient: Self    Pharmacy: [address]    Phone Number: [phone] CALL ANYTIME.     Reason for Call: PATIENT CALLED REQUESTING TO SCHEDULE APPT FOR LATE PERIOD. LEFT SIDE IN HIP/PELVIC AREA IS SWOLLEN, TENDER, CRAMPING. PERIOD IS 8 DAYS LATE. SEVERAL NEGATIVE AT HOME PREGNANCY TEST. H    UB SPOKE WITH NON CLINICAL TO CHECK IF ULTRASOUND APPT IS NEEDED. HUB WAS ASKED TO SEND TELEPHONE ENCOUNTER TO CLINICAL.    When was the patient last seen: 02/17/25    When did it start: 8 DAYS     Where is it located: LEFT SIDE    Characteristics of symptom/severity: DULL ACHE    Timing- Is it constant or intermittent: CONSTANT    What makes it worse: NO    What makes it better: HEATING PAD    What therapies/medications have you tried: HAS NOT TAKEN ANY OVER THE COUNTER MEDICATION.

## 2025-03-25 ENCOUNTER — OFFICE VISIT (OUTPATIENT)
Dept: OBSTETRICS AND GYNECOLOGY | Facility: CLINIC | Age: 25
End: 2025-03-25
Payer: COMMERCIAL

## 2025-03-25 VITALS
HEIGHT: 62 IN | SYSTOLIC BLOOD PRESSURE: 110 MMHG | WEIGHT: 110 LBS | BODY MASS INDEX: 20.24 KG/M2 | DIASTOLIC BLOOD PRESSURE: 68 MMHG

## 2025-03-25 DIAGNOSIS — N83.202 LEFT OVARIAN CYST: ICD-10-CM

## 2025-03-25 DIAGNOSIS — N92.6 LATE PERIOD: Primary | ICD-10-CM

## 2025-03-25 LAB
B-HCG UR QL: NEGATIVE
EXPIRATION DATE: NORMAL
INTERNAL NEGATIVE CONTROL: NORMAL
INTERNAL POSITIVE CONTROL: NORMAL
Lab: NORMAL

## 2025-03-25 PROCEDURE — 81025 URINE PREGNANCY TEST: CPT

## 2025-03-25 PROCEDURE — 99214 OFFICE O/P EST MOD 30 MIN: CPT

## 2025-03-25 NOTE — PROGRESS NOTES
Pelvic Pain        HPI  Elza Rico is a 24 y.o. female, , who presents for initial evaluation evaluation of pelvic pain.      The pain is located in the left lower quadrant and it does not radiate. She has experienced this problem for 10 days. She describes the pain as aching and it occurs daily. She rates her pain score as a 3/10 when at its worst. Patient notes aggravating factors include intercourse, exertion, and palpation and alleviating factors are heating pad.  The patient reports additional symptoms as none.  The patient has not previously been evaluated for pelvic pain. The patient is sexually active. She has not had new partners..    Did the patient have u/s today? Yes.  Findings showed retroverted uterus, homogeneous myometrium, endometrial thickness 4.0mm, normal cervix, left ovarian cyst 57.6mm x 44.7mm x 58.4mm simple, no free fluid. Pending physician review [facility]      Her last LMP was Patient's last menstrual period was 2025 (exact date)..  Periods are regular every 21 days, lasting 5 days.  Dysmenorrhea:none.      Problems with GI: no  History of urinary disease: no  Concern for Anxiety or Depression: no  Exercises Regularly: yes  Tobacco Usage?: No     Additional OB/GYN History   Last Pap : 2024 +ASC/HPV neg  Last Completed Pap Smear            Upcoming       PAP SMEAR (Every 3 Years) Next due on 2024  LIQUID-BASED PAP SMEAR WITH HPV GENOTYPING IF ASCUS ([facility])    2023  LIQUID-BASED PAP SMEAR WITH HPV GENOTYPING IF ASCUS ([facility])    10/11/2022  LIQUID-BASED PAP SMEAR, [facility] ([facility])                            OB History          0    Para   0    Term   0       0    AB   0    Living   0         SAB   0    IAB   0    Ectopic   0    Molar   0    Multiple   0    Live Births   0                  Current Outpatient Medications:     Fexofenadine HCl (ALLEGRA PO), Take  by mouth., Disp: , Rfl:      fluticasone (FLONASE) 50 MCG/ACT nasal spray, Administer 2 sprays into the nostril(s) as directed by provider Daily., Disp: , Rfl:     spironolactone (ALDACTONE) 50 MG tablet, Take 1 tablet by mouth Daily., Disp: , Rfl:     tretinoin (RETIN-A) 0.025 % cream, APPLY 3 NIGHTS A WEEK, INCREASE AS TOLERATED, Disp: , Rfl:      Past Medical History:   Diagnosis Date    Allergic     Seasonal    Anxiety     Carpal tunnel syndrome     Chlamydia 2021    Have had no other problems    EBNITO (juvenile idiopathic arthritis)     Kidney stone     Left ovarian cyst 3/25/2025    Mood disorder     Urinary tract infection 2020    Has been a few years since I've had an UTI        Past Surgical History:   Procedure Laterality Date    CARPAL TUNNEL RELEASE Right 01/24/2025    CYSTOSCOPY W/ URETERAL STENT PLACEMENT Left 09/10/2020    Procedure: CYSTOSCOPY, LEFT URETEROSCOPY, STONE BASKETING AND LEFT URETERAL STENT PLACEMENT;  Surgeon: Rashad Amin MD;  Location: [facility];  Service: Urology;  Laterality: Left;    HAND SURGERY  1/24/2025    Carpal Tunnel Release on RH    WISDOM TOOTH EXTRACTION  2021       The additional following portions of the patient's history were reviewed and updated as appropriate: allergies, current medications, past medical history, past social history, past surgical history, and problem list.      Review of Systems   Constitutional: Negative.    HENT: Negative.     Eyes: Negative.    Respiratory: Negative.     Cardiovascular: Negative.    Gastrointestinal: Negative.    Endocrine: Negative.    Genitourinary:  Positive for pelvic pain.        Late period   Musculoskeletal: Negative.    Skin: Negative.    Allergic/Immunologic: Negative.    Neurological: Negative.    Hematological: Negative.    Psychiatric/Behavioral: Negative.       All other systems reviewed and are negative.     I have reviewed and agree with the HPI, ROS, and historical information as entered above. Tracey Leger, APRN      Objective   BP  "110/68   Ht 157.5 cm (62\")   Wt 49.9 kg (110 lb)   LMP 02/24/2025 (Exact Date)   BMI 20.12 kg/m²     Physical Exam  Vitals and nursing note reviewed.   Constitutional:       General: She is not in acute distress.     Appearance: Normal appearance. She is not ill-appearing, toxic-appearing or diaphoretic.   Pulmonary:      Effort: Pulmonary effort is normal. No respiratory distress.   Abdominal:      Palpations: Abdomen is soft.   Neurological:      Mental Status: She is alert and oriented to person, place, and time.   Psychiatric:         Mood and Affect: Mood normal.         Behavior: Behavior normal.         Thought Content: Thought content normal.         Judgment: Judgment normal.         Assessment & Plan     Assessment and Plan    Problem List Items Addressed This Visit          Genitourinary and Reproductive     Left ovarian cyst    Overview   3/25/25 retroverted uterus, homogeneous myometrium, endometrial thickness 4.0mm, normal cervix, left ovarian cyst 57.6mm x 44.7mm x 58.4mm simple, no free fluid         Relevant Orders    US Non-ob Transvaginal     Other Visit Diagnoses         Late period    -  Primary    Relevant Orders    POC Pregnancy, Urine (Completed)            GYN follow up for late menses and LLQ pain  Ultrasound today shows retroverted uterus, homogeneous myometrium, endometrial thickness 4.0mm, normal cervix, left ovarian cyst 57.6mm x 44.7mm x 58.4mm simple, no free fluid  Reviewed imaging with CF  Ovarian torsion precautions given, call if increase in pain, fever, vomiting.   Patient has home motrin/tylenol to rotate for pain  Late period-upt neg  Follow up in four weeks with repeat ultrasound    Tracey Leger, APRN  03/25/2025  "

## 2025-04-22 ENCOUNTER — OFFICE VISIT (OUTPATIENT)
Age: 25
End: 2025-04-22
Payer: OTHER MISCELLANEOUS

## 2025-04-22 DIAGNOSIS — G56.01 CARPAL TUNNEL SYNDROME OF RIGHT WRIST: Primary | ICD-10-CM

## 2025-04-22 PROCEDURE — 99024 POSTOP FOLLOW-UP VISIT: CPT | Performed by: STUDENT IN AN ORGANIZED HEALTH CARE EDUCATION/TRAINING PROGRAM

## 2025-04-22 NOTE — PROGRESS NOTES
[facility]     Office Visit       Date: 04/22/2025   Patient Name: Elza Rico  MRN: [id number]  YOB: 2000    Referring Physician: John Holt, *     Chief Complaint:   Chief Complaint   Patient presents with   • Follow-up     6.5 week follow up--3 months status post Right carpal tunnel release 01/24/2025     History of Present Illness:   Elza Rico is a 24 y.o. female status post right carpal tunnel release, DOS 1/24/2025 patient has done well since her last clinic visit.  She is returned to work and denies any recurrent her symptoms.  She is overall satisfied with her outcome    Subjective   Review of Systems:   Review of Systems   Constitutional:  Negative for chills, fever, unexpected weight gain and unexpected weight loss.   HENT:  Negative for congestion, postnasal drip and rhinorrhea.    Eyes:  Negative for blurred vision.   Respiratory:  Negative for shortness of breath.    Cardiovascular:  Negative for leg swelling.   Gastrointestinal:  Negative for abdominal pain, nausea and vomiting.   Genitourinary:  Negative for difficulty urinating.   Musculoskeletal:  Positive for arthralgias. Negative for gait problem, joint swelling and myalgias.   Skin:  Negative for skin lesions and wound.   Neurological:  Negative for dizziness, weakness, light-headedness and numbness.   Hematological:  Does not bruise/bleed easily.   Psychiatric/Behavioral:  Negative for depressed mood.       Pertinent review of systems per HPI    I reviewed the patient's chief complaint, history of present illness, review of systems, past medical history, surgical history, family history, social history, medications and allergy list in the EMR on 04/22/2025 and agree with the findings above.    Objective    Vital Signs: There were no vitals filed for this visit.  General: No acute distress. Alert and oriented.    Cardiovascular: Palpable radial pulse.   Respiratory: Breathing is nonlabored.   Ortho Exam:  Examination of the right Upper Extremity:  • Skin examination: There is a well-healed surgical incision overlying the carpal tunnel.  This is nontender palpation.  •Able to make a composite fist  •AIN/PIN/Radial/Ulnar nerves grossly motor intact  •Median/radial/ulnar sensory intact to light touch  • Palpable radial pulse, digits are warm and well-perfused  •   Imaging / Studies:    Imaging Results (Last 24 Hours)       ** No results found for the last 24 hours. **          Assessment / Plan    Assessment/Plan:   Elza Rico is a 24 y.o. female status post right carpal tunnel release, DOS 1/24/2025     Patient has done well postoperatively.  She has returned to all of her activities without restriction and has full painless range of motion.  She may return to clinic as needed if symptoms worsen or fail to improve.  All questions and concerns were addressed.  She is agreeable.      ICD-10-CM ICD-9-CM   1. Carpal tunnel syndrome of right wrist  G56.01 354.0     Follow Up:   Return if symptoms worsen or fail to improve.      Polina Smith MD  [facility]

## 2025-04-23 ENCOUNTER — OFFICE VISIT (OUTPATIENT)
Dept: OBSTETRICS AND GYNECOLOGY | Facility: CLINIC | Age: 25
End: 2025-04-23
Payer: COMMERCIAL

## 2025-04-23 VITALS
WEIGHT: 110.6 LBS | SYSTOLIC BLOOD PRESSURE: 114 MMHG | BODY MASS INDEX: 20.35 KG/M2 | HEIGHT: 62 IN | DIASTOLIC BLOOD PRESSURE: 70 MMHG

## 2025-04-23 DIAGNOSIS — N83.201 RIGHT OVARIAN CYST: Primary | ICD-10-CM

## 2025-04-23 NOTE — PROGRESS NOTES
Chief Complaint   Patient presents with    Gynecologic Exam    Ovarian Cyst     Follow up         Subjective   HPI  Elza Rico is a 24 y.o. female, , who presents for follow up evaluation of ovarian cyst.      Her last LMP was Patient's last menstrual period was 2025 (exact date)..  She was last seen 1 month(s) ago. At that time the plan was  follow up in 4 weeks with repeat U/S  for management of the cyst. Since her last visit she denies pelvic pain. The patient reports additional symptoms as none.      Did the patient have u/s today? Yes. Reviewed findings and discussed follow up     Additional OB/GYN History   Last Pap : 2024,  +ASC/HPV neg   Last Completed Pap Smear            Upcoming       PAP SMEAR (Every 3 Years) Next due on 2024  LIQUID-BASED PAP SMEAR WITH HPV GENOTYPING IF ASCUS ([facility])    2023  LIQUID-BASED PAP SMEAR WITH HPV GENOTYPING IF ASCUS ([facility])    10/11/2022  LIQUID-BASED PAP SMEAR, [facility] ([facility])                          History of abnormal Pap smear: no  Tobacco Usage?: No      Current Outpatient Medications:     Fexofenadine HCl (ALLEGRA PO), Take  by mouth., Disp: , Rfl:     fluticasone (FLONASE) 50 MCG/ACT nasal spray, Administer 2 sprays into the nostril(s) as directed by provider Daily., Disp: , Rfl:     spironolactone (ALDACTONE) 50 MG tablet, Take 1 tablet by mouth Daily., Disp: , Rfl:     tretinoin (RETIN-A) 0.025 % cream, APPLY 3 NIGHTS A WEEK, INCREASE AS TOLERATED, Disp: , Rfl:      Past Medical History:   Diagnosis Date    Allergic     Seasonal    Anxiety     Carpal tunnel syndrome     Chlamydia     Have had no other problems    BENITO (juvenile idiopathic arthritis)     Kidney stone     Left ovarian cyst 3/25/2025    Mood disorder     Urinary tract infection 2020    Has been a few years since I've had an UTI        Past Surgical History:   Procedure Laterality Date    CARPAL TUNNEL RELEASE Right  "01/24/2025    CYSTOSCOPY W/ URETERAL STENT PLACEMENT Left 09/10/2020    Procedure: CYSTOSCOPY, LEFT URETEROSCOPY, STONE BASKETING AND LEFT URETERAL STENT PLACEMENT;  Surgeon: Rashad Amin MD;  Location: [facility];  Service: Urology;  Laterality: Left;    HAND SURGERY  1/24/2025    Carpal Tunnel Release on RH    WISDOM TOOTH EXTRACTION  2021       The additional following portions of the patient's history were reviewed and updated as appropriate: allergies, current medications, past family history, past medical history, past social history, past surgical history, and problem list.    Review of Systems   Constitutional: Negative.    Respiratory: Negative.     Cardiovascular: Negative.    Gastrointestinal: Negative.    Genitourinary: Negative.    Musculoskeletal: Negative.    Neurological: Negative.    Psychiatric/Behavioral: Negative.       All other systems reviewed and are negative.     I have reviewed and agree with the HPI, ROS, and historical information as entered above. Jr Whitehead MD      /70   Ht 157.5 cm (62.01\")   Wt 50.2 kg (110 lb 9.6 oz)   LMP 02/24/2025 (Exact Date)   BMI 20.22 kg/m²     Physical Exam  Vitals reviewed.   Constitutional:       Appearance: Normal appearance.   HENT:      Head: Normocephalic and atraumatic.   Abdominal:      General: Abdomen is flat.      Palpations: Abdomen is soft.   Skin:     General: Skin is warm and dry.   Neurological:      Mental Status: She is alert and oriented to person, place, and time.   Psychiatric:         Mood and Affect: Mood normal.         Behavior: Behavior normal.         Assessment & Plan     Assessment and Plan    Problem List Items Addressed This Visit       Left ovarian cyst - Primary    Overview   3/25/25 retroverted uterus, homogeneous myometrium, endometrial thickness 4.0mm, normal cervix, left ovarian cyst 57.6mm x 44.7mm x 58.4mm simple, no free fluid            Large ovarian cyst has resolved and she is now " symptomatic.       Jr Whitehead MD  04/23/2025

## 2025-04-25 PROCEDURE — 87088 URINE BACTERIA CULTURE: CPT | Performed by: FAMILY MEDICINE

## 2025-04-25 PROCEDURE — 87186 SC STD MICRODIL/AGAR DIL: CPT | Performed by: FAMILY MEDICINE

## 2025-04-25 PROCEDURE — 87086 URINE CULTURE/COLONY COUNT: CPT | Performed by: FAMILY MEDICINE

## 2025-05-14 ENCOUNTER — OFFICE VISIT (OUTPATIENT)
Dept: FAMILY MEDICINE CLINIC | Facility: CLINIC | Age: 25
End: 2025-05-14
Payer: COMMERCIAL

## 2025-05-14 ENCOUNTER — LAB (OUTPATIENT)
Dept: LAB | Facility: HOSPITAL | Age: 25
End: 2025-05-14
Payer: COMMERCIAL

## 2025-05-14 VITALS
HEIGHT: 62 IN | OXYGEN SATURATION: 98 % | BODY MASS INDEX: 20.35 KG/M2 | DIASTOLIC BLOOD PRESSURE: 68 MMHG | HEART RATE: 82 BPM | WEIGHT: 110.6 LBS | SYSTOLIC BLOOD PRESSURE: 114 MMHG

## 2025-05-14 DIAGNOSIS — Z00.00 PREVENTATIVE HEALTH CARE: Primary | ICD-10-CM

## 2025-05-14 DIAGNOSIS — D69.6 THROMBOCYTOPENIA: ICD-10-CM

## 2025-05-14 DIAGNOSIS — Z00.00 PREVENTATIVE HEALTH CARE: ICD-10-CM

## 2025-05-14 DIAGNOSIS — J30.2 SEASONAL ALLERGIES: ICD-10-CM

## 2025-05-14 LAB
ALBUMIN SERPL-MCNC: 4.7 G/DL (ref 3.5–5.2)
ALBUMIN/GLOB SERPL: 1.3 G/DL
ALP SERPL-CCNC: 73 U/L (ref 39–117)
ALT SERPL W P-5'-P-CCNC: 21 U/L (ref 1–33)
ANION GAP SERPL CALCULATED.3IONS-SCNC: 9.7 MMOL/L (ref 5–15)
AST SERPL-CCNC: 23 U/L (ref 1–32)
BASOPHILS # BLD AUTO: 0.02 10*3/MM3 (ref 0–0.2)
BASOPHILS NFR BLD AUTO: 0.5 % (ref 0–1.5)
BILIRUB SERPL-MCNC: 0.4 MG/DL (ref 0–1.2)
BILIRUB UR QL STRIP: NEGATIVE
BUN SERPL-MCNC: 14 MG/DL (ref 6–20)
BUN/CREAT SERPL: 14 (ref 7–25)
CALCIUM SPEC-SCNC: 9.7 MG/DL (ref 8.6–10.5)
CHLORIDE SERPL-SCNC: 104 MMOL/L (ref 98–107)
CHOLEST SERPL-MCNC: 139 MG/DL (ref 0–200)
CLARITY UR: ABNORMAL
CO2 SERPL-SCNC: 25.3 MMOL/L (ref 22–29)
COLOR UR: YELLOW
CREAT SERPL-MCNC: 1 MG/DL (ref 0.57–1)
DEPRECATED RDW RBC AUTO: 41.2 FL (ref 37–54)
EGFRCR SERPLBLD CKD-EPI 2021: 80.8 ML/MIN/1.73
EOSINOPHIL # BLD AUTO: 0.08 10*3/MM3 (ref 0–0.4)
EOSINOPHIL NFR BLD AUTO: 2.1 % (ref 0.3–6.2)
ERYTHROCYTE [DISTWIDTH] IN BLOOD BY AUTOMATED COUNT: 12.4 % (ref 12.3–15.4)
GLOBULIN UR ELPH-MCNC: 3.5 GM/DL
GLUCOSE SERPL-MCNC: 87 MG/DL (ref 65–99)
GLUCOSE UR STRIP-MCNC: NEGATIVE MG/DL
HCT VFR BLD AUTO: 39.9 % (ref 34–46.6)
HDLC SERPL-MCNC: 57 MG/DL (ref 40–60)
HGB BLD-MCNC: 13.4 G/DL (ref 12–15.9)
HGB UR QL STRIP.AUTO: NEGATIVE
HOLD SPECIMEN: NORMAL
IMM GRANULOCYTES # BLD AUTO: 0 10*3/MM3 (ref 0–0.05)
IMM GRANULOCYTES NFR BLD AUTO: 0 % (ref 0–0.5)
KETONES UR QL STRIP: NEGATIVE
LDLC SERPL CALC-MCNC: 73 MG/DL (ref 0–100)
LDLC/HDLC SERPL: 1.31 {RATIO}
LEUKOCYTE ESTERASE UR QL STRIP.AUTO: NEGATIVE
LYMPHOCYTES # BLD AUTO: 2.08 10*3/MM3 (ref 0.7–3.1)
LYMPHOCYTES NFR BLD AUTO: 54.6 % (ref 19.6–45.3)
MCH RBC QN AUTO: 30.5 PG (ref 26.6–33)
MCHC RBC AUTO-ENTMCNC: 33.6 G/DL (ref 31.5–35.7)
MCV RBC AUTO: 90.7 FL (ref 79–97)
MONOCYTES # BLD AUTO: 0.36 10*3/MM3 (ref 0.1–0.9)
MONOCYTES NFR BLD AUTO: 9.4 % (ref 5–12)
NEUTROPHILS NFR BLD AUTO: 1.27 10*3/MM3 (ref 1.7–7)
NEUTROPHILS NFR BLD AUTO: 33.4 % (ref 42.7–76)
NITRITE UR QL STRIP: NEGATIVE
NRBC BLD AUTO-RTO: 0 /100 WBC (ref 0–0.2)
PATHOLOGY REVIEW: YES
PH UR STRIP.AUTO: 6 [PH] (ref 5–8)
PLATELET # BLD AUTO: 159 10*3/MM3 (ref 140–450)
PLATELETS (CITRATED) BY AUTOMATED COUNT: 111 10*3/MM3 (ref 140–450)
PMV BLD AUTO: 10.6 FL (ref 6–12)
POTASSIUM SERPL-SCNC: 4.1 MMOL/L (ref 3.5–5.2)
PROT SERPL-MCNC: 8.2 G/DL (ref 6–8.5)
PROT UR QL STRIP: NEGATIVE
RBC # BLD AUTO: 4.4 10*6/MM3 (ref 3.77–5.28)
SODIUM SERPL-SCNC: 139 MMOL/L (ref 136–145)
SP GR UR STRIP: 1.03 (ref 1–1.03)
T4 FREE SERPL-MCNC: 1.02 NG/DL (ref 0.92–1.68)
TRIGL SERPL-MCNC: 37 MG/DL (ref 0–150)
TSH SERPL DL<=0.05 MIU/L-ACNC: 2.94 UIU/ML (ref 0.27–4.2)
UROBILINOGEN UR QL STRIP: ABNORMAL
VLDLC SERPL-MCNC: 9 MG/DL (ref 5–40)
WBC NRBC COR # BLD AUTO: 3.81 10*3/MM3 (ref 3.4–10.8)

## 2025-05-14 PROCEDURE — 82306 VITAMIN D 25 HYDROXY: CPT

## 2025-05-14 PROCEDURE — 80061 LIPID PANEL: CPT

## 2025-05-14 PROCEDURE — 81003 URINALYSIS AUTO W/O SCOPE: CPT

## 2025-05-14 PROCEDURE — 99395 PREV VISIT EST AGE 18-39: CPT | Performed by: INTERNAL MEDICINE

## 2025-05-14 PROCEDURE — 80050 GENERAL HEALTH PANEL: CPT

## 2025-05-14 PROCEDURE — 83036 HEMOGLOBIN GLYCOSYLATED A1C: CPT

## 2025-05-14 PROCEDURE — 36415 COLL VENOUS BLD VENIPUNCTURE: CPT

## 2025-05-14 PROCEDURE — 85049 AUTOMATED PLATELET COUNT: CPT

## 2025-05-14 PROCEDURE — 84439 ASSAY OF FREE THYROXINE: CPT

## 2025-05-14 NOTE — PROGRESS NOTES
Chief Complaint   Patient presents with    Annual Exam       HPI:  Elza Rico is a 24 y.o. female who presents today for annual exam.    ROS:  Constitutional: no fevers, night sweats or unexplained weight loss  Eyes: no vision changes  ENT: no runny nose, ear pain, sore throat  Cardio: no chest pain, palpitations  Pulm: no shortness of breath, wheezing, or cough  GI: no abdominal pain or changes in bowel movements  : no difficulty urinating  MSK: no difficulty ambulating, no joint pain  Neuro: no weakness, dizziness or headache  Psych: no trouble sleeping  Endo: no change in appetite      Past Medical History:   Diagnosis Date    Allergic     Seasonal    Anxiety     Carpal tunnel syndrome     Chlamydia 2021    Have had no other problems    BENITO (juvenile idiopathic arthritis)     Kidney stone     Left ovarian cyst 3/25/2025    Mood disorder     Urinary tract infection 2020    Has been a few years since I've had an UTI      Family History   Problem Relation Age of Onset    Hypertension Mother     Nephrolithiasis Father     Hypertension Father     Thyroid disease Father     Diabetes Father         Pre-diabetic    Diabetes Maternal Grandfather       Social History     Socioeconomic History    Marital status: Single   Tobacco Use    Smoking status: Never     Passive exposure: Never    Smokeless tobacco: Never   Vaping Use    Vaping status: Never Used   Substance and Sexual Activity    Alcohol use: Not Currently     Comment: Rarely    Drug use: Never    Sexual activity: Yes     Partners: Male     Birth control/protection: Condom      No Known Allergies     There is no immunization history on file for this patient.     PE:  Vitals:    05/14/25 1234   Pulse: 82   SpO2: 98%      Body mass index is 20.22 kg/m².    Gen Appearance: NAD  HEENT: Normocephalic, PERRLA, no thyromegaly, trache midline  Heart: RRR, normal S1 and S2, no murmur  Lungs: CTA b/l, no wheezing, no crackles  Abdomen: Soft, non-tender,  non-distended, no guarding and BSx4  MSK: Moves all extremities well, normal gait, no peripheral edema  Pulses: Palpable and equal b/l  Lymph nodes: No palpable lymphadenopathy   Neuro: No focal deficits      Current Outpatient Medications   Medication Sig Dispense Refill    Fexofenadine HCl (ALLEGRA PO) Take  by mouth.      fluticasone (FLONASE) 50 MCG/ACT nasal spray Administer 2 sprays into the nostril(s) as directed by provider Daily.      spironolactone (ALDACTONE) 50 MG tablet Take 1 tablet by mouth Daily.      tretinoin (RETIN-A) 0.025 % cream APPLY 3 NIGHTS A WEEK, INCREASE AS TOLERATED      sulfamethoxazole-trimethoprim (BACTRIM DS,SEPTRA DS) 800-160 MG per tablet Take 1 tablet by mouth 2 (Two) Times a Day. (Patient not taking: Reported on 5/14/2025) 20 tablet 0     No current facility-administered medications for this visit.        Diagnoses and all orders for this visit:    1. Preventative health care (Primary)  -     CBC & Differential; Future  -     Comprehensive Metabolic Panel; Future  -     Hemoglobin A1c; Future  -     Lipid Panel; Future  -     TSH+Free T4; Future  -     Urinalysis With Culture If Indicated - Urine, Clean Catch; Future  -     Vitamin D,25-Hydroxy; Future  Counseled on healthy weight, nutrition, physical activity, cancer screening, and immunizations.    2. Seasonal allergies    3. Thrombocytopenia  -     Platelet Ct On Citrated Bld; Future  -     Peripheral Blood Smear; Future         Return in about 1 year (around 5/14/2026) for Annual physical.     Dictated Utilizing Dragon Dictation    Please note that portions of this note were completed with a voice recognition program.    Part of this note may be an electronic transcription/translation of spoken language to printed text using the Dragon Dictation System.

## 2025-05-15 LAB
25(OH)D3 SERPL-MCNC: 26.6 NG/ML (ref 30–100)
HBA1C MFR BLD: 5 % (ref 4.8–5.6)
LAB AP CASE REPORT: NORMAL
PATH REPORT.FINAL DX SPEC: NORMAL

## 2025-05-26 DIAGNOSIS — D70.9 NEUTROPENIA, UNSPECIFIED TYPE: Primary | ICD-10-CM

## 2025-05-26 DIAGNOSIS — D69.6 THROMBOCYTOPENIA: ICD-10-CM

## 2025-07-21 ENCOUNTER — CONSULT (OUTPATIENT)
Age: 25
End: 2025-07-21
Payer: COMMERCIAL

## 2025-07-21 ENCOUNTER — LAB (OUTPATIENT)
Facility: HOSPITAL | Age: 25
End: 2025-07-21
Payer: COMMERCIAL

## 2025-07-21 VITALS
SYSTOLIC BLOOD PRESSURE: 114 MMHG | WEIGHT: 112 LBS | TEMPERATURE: 97.3 F | RESPIRATION RATE: 12 BRPM | DIASTOLIC BLOOD PRESSURE: 74 MMHG | OXYGEN SATURATION: 98 % | BODY MASS INDEX: 20.61 KG/M2 | HEART RATE: 76 BPM | HEIGHT: 62 IN

## 2025-07-21 DIAGNOSIS — D70.8 OTHER NEUTROPENIA: Primary | ICD-10-CM

## 2025-07-21 DIAGNOSIS — D70.8 OTHER NEUTROPENIA: ICD-10-CM

## 2025-07-21 LAB
BASOPHILS # BLD AUTO: 0.04 10*3/MM3 (ref 0–0.2)
BASOPHILS NFR BLD AUTO: 1.1 % (ref 0–1.5)
CRP SERPL-MCNC: <0.3 MG/DL (ref 0–0.5)
DEPRECATED RDW RBC AUTO: 39.5 FL (ref 37–54)
EOSINOPHIL # BLD AUTO: 0.12 10*3/MM3 (ref 0–0.4)
EOSINOPHIL NFR BLD AUTO: 3.2 % (ref 0.3–6.2)
ERYTHROCYTE [DISTWIDTH] IN BLOOD BY AUTOMATED COUNT: 12.3 % (ref 12.3–15.4)
ERYTHROCYTE [SEDIMENTATION RATE] IN BLOOD: 7 MM/HR (ref 0–20)
FERRITIN SERPL-MCNC: 32.8 NG/ML (ref 13–150)
HAV IGM SERPL QL IA: NORMAL
HBV CORE IGM SERPL QL IA: NORMAL
HBV SURFACE AG SERPL QL IA: NORMAL
HCT VFR BLD AUTO: 38.3 % (ref 34–46.6)
HCV AB SER QL: NORMAL
HGB BLD-MCNC: 12.7 G/DL (ref 12–15.9)
HIV 1+2 AB+HIV1 P24 AG SERPL QL IA: NORMAL
IMM GRANULOCYTES # BLD AUTO: 0 10*3/MM3 (ref 0–0.05)
IMM GRANULOCYTES NFR BLD AUTO: 0 % (ref 0–0.5)
IRON 24H UR-MRATE: 53 MCG/DL (ref 37–145)
IRON SATN MFR SERPL: 11 % (ref 20–50)
LYMPHOCYTES # BLD AUTO: 1.94 10*3/MM3 (ref 0.7–3.1)
LYMPHOCYTES NFR BLD AUTO: 51.1 % (ref 19.6–45.3)
MCH RBC QN AUTO: 28.6 PG (ref 26.6–33)
MCHC RBC AUTO-ENTMCNC: 33.2 G/DL (ref 31.5–35.7)
MCV RBC AUTO: 86.3 FL (ref 79–97)
MONOCYTES # BLD AUTO: 0.36 10*3/MM3 (ref 0.1–0.9)
MONOCYTES NFR BLD AUTO: 9.5 % (ref 5–12)
NEUTROPHILS NFR BLD AUTO: 1.34 10*3/MM3 (ref 1.7–7)
NEUTROPHILS NFR BLD AUTO: 35.1 % (ref 42.7–76)
PLATELET # BLD AUTO: 127 10*3/MM3 (ref 140–450)
PMV BLD AUTO: 10.3 FL (ref 6–12)
RBC # BLD AUTO: 4.44 10*6/MM3 (ref 3.77–5.28)
TIBC SERPL-MCNC: 474 MCG/DL (ref 298–536)
TRANSFERRIN SERPL-MCNC: 318 MG/DL (ref 200–360)
WBC NRBC COR # BLD AUTO: 3.8 10*3/MM3 (ref 3.4–10.8)

## 2025-07-21 PROCEDURE — 85652 RBC SED RATE AUTOMATED: CPT

## 2025-07-21 PROCEDURE — 82607 VITAMIN B-12: CPT

## 2025-07-21 PROCEDURE — 82746 ASSAY OF FOLIC ACID SERUM: CPT

## 2025-07-21 PROCEDURE — 82728 ASSAY OF FERRITIN: CPT

## 2025-07-21 PROCEDURE — 80074 ACUTE HEPATITIS PANEL: CPT

## 2025-07-21 PROCEDURE — 85025 COMPLETE CBC W/AUTO DIFF WBC: CPT

## 2025-07-21 PROCEDURE — 36415 COLL VENOUS BLD VENIPUNCTURE: CPT

## 2025-07-21 PROCEDURE — 99204 OFFICE O/P NEW MOD 45 MIN: CPT | Performed by: INTERNAL MEDICINE

## 2025-07-21 PROCEDURE — 83540 ASSAY OF IRON: CPT

## 2025-07-21 PROCEDURE — 86431 RHEUMATOID FACTOR QUANT: CPT

## 2025-07-21 PROCEDURE — 86038 ANTINUCLEAR ANTIBODIES: CPT

## 2025-07-21 PROCEDURE — 86140 C-REACTIVE PROTEIN: CPT

## 2025-07-21 PROCEDURE — 84466 ASSAY OF TRANSFERRIN: CPT

## 2025-07-21 PROCEDURE — G0432 EIA HIV-1/HIV-2 SCREEN: HCPCS

## 2025-07-21 NOTE — PROGRESS NOTES
DATE OF CONSULTATION: 7/21/2025    REFERRING PHYSICIAN: John Holt DO    Dear John Wan, DO  Thank you for asking for my medical advice on this patient. I saw her in the  [facility] office on 7/21/2025    REASON FOR CONSULTATION: Neutropenia    PROBLEM LIST:   1.  Neutropenia:  A.  ANC 1270 May 2025  2.  Seasonal allergy    HISTORY OF PRESENT ILLNESS: The patient is a very pleasant 24 y.o.  female   who was in her usual state of health until May 2025.  The patient presented with abnormal blood work results.  Her absolute full count was 1270.  This had persisted over the last few CBCs.  The patient was referred to me for further recommendations.    SUBJECTIVE: When I saw the patient today she is here with her mother.  All in all she is doing well.  Denies any fever chills or night sweats.  No recurrent infections.    Review of Systems   Constitutional:  Positive for fatigue.   Respiratory: Negative.     Cardiovascular: Negative.    Gastrointestinal: Negative.    Psychiatric/Behavioral:  The patient is nervous/anxious.        Past Medical History:   Diagnosis Date    Allergic     Seasonal    Anxiety     Carpal tunnel syndrome     Chlamydia 2021    Have had no other problems    BENITO (juvenile idiopathic arthritis)     Kidney stone     Left ovarian cyst 3/25/2025    Mood disorder     Urinary tract infection 2020    Has been a few years since I've had an UTI       Social History     Socioeconomic History    Marital status: Single   Tobacco Use    Smoking status: Never     Passive exposure: Never    Smokeless tobacco: Never   Vaping Use    Vaping status: Never Used   Substance and Sexual Activity    Alcohol use: Not Currently     Comment: Rarely    Drug use: Never    Sexual activity: Yes     Partners: Male     Birth control/protection: Condom       Family History   Problem Relation Age of Onset    Hypertension Mother     Nephrolithiasis Father     Hypertension Father     Thyroid disease Father   "   Diabetes Father         Pre-diabetic    Diabetes Maternal Grandfather        Past Surgical History:   Procedure Laterality Date    CARPAL TUNNEL RELEASE Right 01/24/2025    CYSTOSCOPY W/ URETERAL STENT PLACEMENT Left 09/10/2020    Procedure: CYSTOSCOPY, LEFT URETEROSCOPY, STONE BASKETING AND LEFT URETERAL STENT PLACEMENT;  Surgeon: Rashad Amin MD;  Location: [facility];  Service: Urology;  Laterality: Left;    HAND SURGERY  1/24/2025    Carpal Tunnel Release on RH    WISDOM TOOTH EXTRACTION  2021       No Known Allergies       Current Outpatient Medications:     ELDERBERRY PO, Take  by mouth Daily., Disp: , Rfl:     fluticasone (FLONASE) 50 MCG/ACT nasal spray, Administer 2 sprays into the nostril(s) as directed by provider Daily., Disp: , Rfl:     NON FORMULARY, FACTOR RELIEF (MULTI VIT), Disp: , Rfl:     PHYSICAL EXAMINATION:   /74   Pulse 76   Temp 97.3 °F (36.3 °C)   Resp 12   Ht 157.5 cm (62\")   Wt 50.8 kg (112 lb)   SpO2 98%   BMI 20.49 kg/m²   Pain Score    07/21/25 1100   PainSc: 0-No pain                   ECOG Performance Status: 1 - Symptomatic but completely ambulatory  General Appearance:  alert, cooperative, no apparent distress and appears stated age   Neurologic/Psychiatric: A&O x 3, gait steady, appropriate affect, strength 5/5 in all muscle groups   HEENT:  Normocephalic, without obvious abnormality, mucous membranes moist   Neck: Supple, symmetrical, trachea midline, no adenopathy;  No thyromegaly, masses, or tenderness   Lungs:   Clear to auscultation bilaterally; respirations regular, even, and unlabored bilaterally   Heart:  Regular rate and rhythm, no murmurs appreciated   Abdomen:   Soft, non-tender, non-distended, and no organomegaly   Lymph nodes: No cervical, supraclavicular, inguinal or axillary adenopathy noted   Extremities: Normal, atraumatic; no clubbing, cyanosis, or edema    Skin: No rashes, ulcers, or suspicious lesions noted       No visits with results " within 2 Week(s) from this visit.   Latest known visit with results is:   Lab on 05/14/2025   Component Date Value Ref Range Status    Glucose 05/14/2025 87  65 - 99 mg/dL Final    BUN 05/14/2025 14  6 - 20 mg/dL Final    Creatinine 05/14/2025 1.00  0.57 - 1.00 mg/dL Final    Sodium 05/14/2025 139  136 - 145 mmol/L Final    Potassium 05/14/2025 4.1  3.5 - 5.2 mmol/L Final    Chloride 05/14/2025 104  98 - 107 mmol/L Final    CO2 05/14/2025 25.3  22.0 - 29.0 mmol/L Final    Calcium 05/14/2025 9.7  8.6 - 10.5 mg/dL Final    Total Protein 05/14/2025 8.2  6.0 - 8.5 g/dL Final    Albumin 05/14/2025 4.7  3.5 - 5.2 g/dL Final    ALT (SGPT) 05/14/2025 21  1 - 33 U/L Final    AST (SGOT) 05/14/2025 23  1 - 32 U/L Final    Alkaline Phosphatase 05/14/2025 73  39 - 117 U/L Final    Total Bilirubin 05/14/2025 0.4  0.0 - 1.2 mg/dL Final    Globulin 05/14/2025 3.5  gm/dL Final    A/G Ratio 05/14/2025 1.3  g/dL Final    BUN/Creatinine Ratio 05/14/2025 14.0  7.0 - 25.0 Final    Anion Gap 05/14/2025 9.7  5.0 - 15.0 mmol/L Final    eGFR 05/14/2025 80.8  >60.0 mL/min/1.73 Final    Hemoglobin A1C 05/14/2025 5.00  4.80 - 5.60 % Final    Total Cholesterol 05/14/2025 139  0 - 200 mg/dL Final    Triglycerides 05/14/2025 37  0 - 150 mg/dL Final    HDL Cholesterol 05/14/2025 57  40 - 60 mg/dL Final    LDL Cholesterol  05/14/2025 73  0 - 100 mg/dL Final    VLDL Cholesterol 05/14/2025 9  5 - 40 mg/dL Final    LDL/HDL Ratio 05/14/2025 1.31   Final    TSH 05/14/2025 2.940  0.270 - 4.200 uIU/mL Final    Free T4 05/14/2025 1.02  0.92 - 1.68 ng/dL Final    Color, UA 05/14/2025 Yellow  Yellow, Straw Final    Appearance, UA 05/14/2025 Cloudy (A)  Clear Final    pH, UA 05/14/2025 6.0  5.0 - 8.0 Final    Specific Gravity, UA 05/14/2025 1.026  1.005 - 1.030 Final    Glucose, UA 05/14/2025 Negative  Negative Final    Ketones, UA 05/14/2025 Negative  Negative Final    Bilirubin, UA 05/14/2025 Negative  Negative Final    Blood, UA 05/14/2025 Negative   Negative Final    Protein, UA 05/14/2025 Negative  Negative Final    Leuk Esterase, UA 05/14/2025 Negative  Negative Final    Nitrite, UA 05/14/2025 Negative  Negative Final    Urobilinogen, UA 05/14/2025 0.2 E.U./dL  0.2 - 1.0 E.U./dL Final    25 Hydroxy, Vitamin D 05/14/2025 26.6 (L)  30.0 - 100.0 ng/ml Final    Platelets (Citrated Blood) 05/14/2025 111 (L)  140 - 450 10*3/mm3 Final    Pathology Review 05/14/2025 Yes   Final    WBC 05/14/2025 3.81  3.40 - 10.80 10*3/mm3 Final    RBC 05/14/2025 4.40  3.77 - 5.28 10*6/mm3 Final    Hemoglobin 05/14/2025 13.4  12.0 - 15.9 g/dL Final    Hematocrit 05/14/2025 39.9  34.0 - 46.6 % Final    MCV 05/14/2025 90.7  79.0 - 97.0 fL Final    MCH 05/14/2025 30.5  26.6 - 33.0 pg Final    MCHC 05/14/2025 33.6  31.5 - 35.7 g/dL Final    RDW 05/14/2025 12.4  12.3 - 15.4 % Final    RDW-SD 05/14/2025 41.2  37.0 - 54.0 fl Final    MPV 05/14/2025 10.6  6.0 - 12.0 fL Final    Platelets 05/14/2025 159  140 - 450 10*3/mm3 Final    Neutrophil % 05/14/2025 33.4 (L)  42.7 - 76.0 % Final    Lymphocyte % 05/14/2025 54.6 (H)  19.6 - 45.3 % Final    Monocyte % 05/14/2025 9.4  5.0 - 12.0 % Final    Eosinophil % 05/14/2025 2.1  0.3 - 6.2 % Final    Basophil % 05/14/2025 0.5  0.0 - 1.5 % Final    Immature Grans % 05/14/2025 0.0  0.0 - 0.5 % Final    Neutrophils, Absolute 05/14/2025 1.27 (L)  1.70 - 7.00 10*3/mm3 Final    Lymphocytes, Absolute 05/14/2025 2.08  0.70 - 3.10 10*3/mm3 Final    Monocytes, Absolute 05/14/2025 0.36  0.10 - 0.90 10*3/mm3 Final    Eosinophils, Absolute 05/14/2025 0.08  0.00 - 0.40 10*3/mm3 Final    Basophils, Absolute 05/14/2025 0.02  0.00 - 0.20 10*3/mm3 Final    Immature Grans, Absolute 05/14/2025 0.00  0.00 - 0.05 10*3/mm3 Final    nRBC 05/14/2025 0.0  0.0 - 0.2 /100 WBC Final    Extra Tube 05/14/2025 Hold for add-ons.   Final    Auto resulted.    Final Diagnosis 05/14/2025    Final                    Value:1.  Peripheral blood smear review:   - A.  Absolute  neutropenia    Comment: Neutropenia of this mild degree may have many possible causes including various drugs, acute infections (especially viral, nutritional deficiencies and certain debilitated states.  In the case of viral infections, neutropenia develops in the first 2 days and may persist for about 5 days.  Persistent unexplained neutropenia generally warrants further investigation.      Case Report 05/14/2025    Final                    Value:Surgical Pathology Report                         Case: [id number]                                  Authorizing Provider:  John Holt DO  Collected:           05/14/2025 01:16 PM          Ordering Location:     [facility]   Received:            05/14/2025 11:57 PM                                 [facility]                                                                      Pathologist:           Jayy Guzman MD                                                         Specimen:    Blood, Venous, Peripheral Blood Smear                                                          No results found.      DIAGNOSTIC DATA:   Extensive patient medical records including doctor notes, blood work results, pathology report and imaging reports reviewed by me and documented in the patient's chart.      ASSESSMENT: The patient is a very pleasant 24 y.o.  female  with neutropenia    PLAN:     1.  Neutropenia:  A.  I had a long discussion today with the patient and her mother about her  new diagnosis of neutropenia. I reviewed the patient's documents including refereing provider's notes, lab results, imaging report.   B.  I explained to the patient her neutropenia is mild.  Her ANC is in the safe range above 1000.  C.  I will check the patient CBC today I will check the TONIO RF for autoimmune markers as well as inflammatory markers with ESR and CRP.  I will also check  her vitamin levels.  I will also check HIV and hepatitis profile.  D.  She will follow with me in 1 month to go over the results.    2.  Seasonal allergy:  A.  The patient has been on 2 different over-the-counter supplements.  I do not see this is contributing to her neutropenia however we may have to stop this down the road if her neutropenia gets worse.    FOLLOW UP: 1 month to go over the results.    Tiffanie Orantes MD  7/21/2025

## 2025-07-22 LAB
CHROMATIN AB SERPL-ACNC: <10 IU/ML (ref 0–14)
FOLATE SERPL-MCNC: 15.2 NG/ML (ref 4.78–24.2)
VIT B12 BLD-MCNC: 623 PG/ML (ref 211–946)

## 2025-07-23 LAB — ANA SER QL: NEGATIVE

## 2025-08-15 ENCOUNTER — OFFICE VISIT (OUTPATIENT)
Age: 25
End: 2025-08-15
Payer: COMMERCIAL

## 2025-08-15 VITALS
HEIGHT: 62 IN | BODY MASS INDEX: 20.61 KG/M2 | TEMPERATURE: 97.3 F | RESPIRATION RATE: 16 BRPM | DIASTOLIC BLOOD PRESSURE: 62 MMHG | OXYGEN SATURATION: 100 % | SYSTOLIC BLOOD PRESSURE: 107 MMHG | HEART RATE: 73 BPM | WEIGHT: 112 LBS

## 2025-08-15 DIAGNOSIS — D70.8 OTHER NEUTROPENIA: Primary | ICD-10-CM

## 2025-08-15 PROCEDURE — 99214 OFFICE O/P EST MOD 30 MIN: CPT | Performed by: INTERNAL MEDICINE

## (undated) DEVICE — NITINOL WIRE WITH HYDROPHILIC TIP: Brand: SENSOR

## (undated) DEVICE — GLV SURG SENSICARE PI MIC PF SZ7.5 LF STRL

## (undated) DEVICE — PK CYSTO-TUR BASIC 10

## (undated) DEVICE — NITINOL STONE RETRIEVAL BASKET: Brand: ZERO TIP

## (undated) DEVICE — CATH URETRL FLXITP POLLACK STD 5F 70CM